# Patient Record
Sex: MALE | Race: WHITE | NOT HISPANIC OR LATINO | URBAN - METROPOLITAN AREA
[De-identification: names, ages, dates, MRNs, and addresses within clinical notes are randomized per-mention and may not be internally consistent; named-entity substitution may affect disease eponyms.]

---

## 2017-08-07 ENCOUNTER — OUTPATIENT (OUTPATIENT)
Dept: OUTPATIENT SERVICES | Facility: HOSPITAL | Age: 27
LOS: 1 days | Discharge: HOME | End: 2017-08-07

## 2017-08-07 DIAGNOSIS — F16.20 HALLUCINOGEN DEPENDENCE, UNCOMPLICATED: ICD-10-CM

## 2017-08-07 DIAGNOSIS — F33.1 MAJOR DEPRESSIVE DISORDER, RECURRENT, MODERATE: ICD-10-CM

## 2017-08-07 DIAGNOSIS — F10.20 ALCOHOL DEPENDENCE, UNCOMPLICATED: ICD-10-CM

## 2017-08-12 ENCOUNTER — EMERGENCY (EMERGENCY)
Facility: HOSPITAL | Age: 27
LOS: 0 days | Discharge: HOME | End: 2017-08-12
Admitting: INTERNAL MEDICINE

## 2017-08-12 DIAGNOSIS — K08.89 OTHER SPECIFIED DISORDERS OF TEETH AND SUPPORTING STRUCTURES: ICD-10-CM

## 2017-08-12 DIAGNOSIS — K05.219 AGGRESSIVE PERIODONTITIS, LOCALIZED, UNSPECIFIED SEVERITY: ICD-10-CM

## 2017-08-12 DIAGNOSIS — Z79.899 OTHER LONG TERM (CURRENT) DRUG THERAPY: ICD-10-CM

## 2017-08-12 DIAGNOSIS — Z87.891 PERSONAL HISTORY OF NICOTINE DEPENDENCE: ICD-10-CM

## 2018-05-08 ENCOUNTER — INPATIENT (INPATIENT)
Facility: HOSPITAL | Age: 28
LOS: 2 days | Discharge: HOME | End: 2018-05-11
Attending: INTERNAL MEDICINE | Admitting: INTERNAL MEDICINE

## 2018-05-08 VITALS
HEART RATE: 84 BPM | RESPIRATION RATE: 16 BRPM | WEIGHT: 154.98 LBS | DIASTOLIC BLOOD PRESSURE: 79 MMHG | HEIGHT: 64 IN | SYSTOLIC BLOOD PRESSURE: 131 MMHG | TEMPERATURE: 97 F

## 2018-05-08 DIAGNOSIS — F39 UNSPECIFIED MOOD [AFFECTIVE] DISORDER: ICD-10-CM

## 2018-05-08 DIAGNOSIS — F10.20 ALCOHOL DEPENDENCE, UNCOMPLICATED: ICD-10-CM

## 2018-05-08 DIAGNOSIS — F17.200 NICOTINE DEPENDENCE, UNSPECIFIED, UNCOMPLICATED: ICD-10-CM

## 2018-05-08 DIAGNOSIS — F41.9 ANXIETY DISORDER, UNSPECIFIED: ICD-10-CM

## 2018-05-08 DIAGNOSIS — F12.10 CANNABIS ABUSE, UNCOMPLICATED: ICD-10-CM

## 2018-05-08 DIAGNOSIS — Z98.890 OTHER SPECIFIED POSTPROCEDURAL STATES: Chronic | ICD-10-CM

## 2018-05-08 DIAGNOSIS — F16.10 HALLUCINOGEN ABUSE, UNCOMPLICATED: ICD-10-CM

## 2018-05-08 LAB
ALBUMIN SERPL ELPH-MCNC: 5.3 G/DL — HIGH (ref 3.5–5.2)
ALP SERPL-CCNC: 99 U/L — SIGNIFICANT CHANGE UP (ref 30–115)
ALT FLD-CCNC: 27 U/L — SIGNIFICANT CHANGE UP (ref 0–41)
AMMONIA BLD-MCNC: 53 UMOL/L — SIGNIFICANT CHANGE UP (ref 11–55)
AMYLASE P1 CFR SERPL: 38 U/L — SIGNIFICANT CHANGE UP (ref 25–115)
ANION GAP SERPL CALC-SCNC: 15 MMOL/L — HIGH (ref 7–14)
APTT BLD: 28.2 SEC — SIGNIFICANT CHANGE UP (ref 27–39.2)
AST SERPL-CCNC: 22 U/L — SIGNIFICANT CHANGE UP (ref 0–41)
BILIRUB SERPL-MCNC: 0.5 MG/DL — SIGNIFICANT CHANGE UP (ref 0.2–1.2)
BUN SERPL-MCNC: 15 MG/DL — SIGNIFICANT CHANGE UP (ref 10–20)
CALCIUM SERPL-MCNC: 10.5 MG/DL — HIGH (ref 8.5–10.1)
CHLORIDE SERPL-SCNC: 96 MMOL/L — LOW (ref 98–110)
CHOLEST SERPL-MCNC: 199 MG/DL — SIGNIFICANT CHANGE UP (ref 100–200)
CO2 SERPL-SCNC: 33 MMOL/L — HIGH (ref 17–32)
CREAT SERPL-MCNC: 1.1 MG/DL — SIGNIFICANT CHANGE UP (ref 0.7–1.5)
ETHANOL SERPL-MCNC: <10 MG/DL — HIGH
GGT SERPL-CCNC: 59 U/L — HIGH (ref 1–40)
GLUCOSE SERPL-MCNC: 86 MG/DL — SIGNIFICANT CHANGE UP (ref 70–99)
HCT VFR BLD CALC: 48 % — SIGNIFICANT CHANGE UP (ref 42–52)
HDLC SERPL-MCNC: 46 MG/DL — SIGNIFICANT CHANGE UP (ref 40–125)
HGB BLD-MCNC: 17 G/DL — SIGNIFICANT CHANGE UP (ref 14–18)
HIV 1 & 2 AB SERPL IA.RAPID: SIGNIFICANT CHANGE UP
INR BLD: 1.19 RATIO — SIGNIFICANT CHANGE UP (ref 0.65–1.3)
LIPID PNL WITH DIRECT LDL SERPL: 127 MG/DL — SIGNIFICANT CHANGE UP (ref 4–129)
MAGNESIUM SERPL-MCNC: 2.2 MG/DL — SIGNIFICANT CHANGE UP (ref 1.8–2.4)
MCHC RBC-ENTMCNC: 29.6 PG — SIGNIFICANT CHANGE UP (ref 27–31)
MCHC RBC-ENTMCNC: 35.4 G/DL — SIGNIFICANT CHANGE UP (ref 32–37)
MCV RBC AUTO: 83.6 FL — SIGNIFICANT CHANGE UP (ref 80–94)
NRBC # BLD: 0 /100 WBCS — SIGNIFICANT CHANGE UP (ref 0–0)
PLATELET # BLD AUTO: 281 K/UL — SIGNIFICANT CHANGE UP (ref 130–400)
POTASSIUM SERPL-MCNC: 4.2 MMOL/L — SIGNIFICANT CHANGE UP (ref 3.5–5)
POTASSIUM SERPL-SCNC: 4.2 MMOL/L — SIGNIFICANT CHANGE UP (ref 3.5–5)
PROT SERPL-MCNC: 7.2 G/DL — SIGNIFICANT CHANGE UP (ref 6–8)
PROTHROM AB SERPL-ACNC: 12.9 SEC — HIGH (ref 9.95–12.87)
RBC # BLD: 5.74 M/UL — SIGNIFICANT CHANGE UP (ref 4.7–6.1)
RBC # FLD: 12 % — SIGNIFICANT CHANGE UP (ref 11.5–14.5)
SODIUM SERPL-SCNC: 144 MMOL/L — SIGNIFICANT CHANGE UP (ref 135–146)
TOTAL CHOLESTEROL/HDL RATIO MEASUREMENT: 4.3 RATIO — SIGNIFICANT CHANGE UP (ref 4–5.5)
TRIGL SERPL-MCNC: 220 MG/DL — HIGH (ref 10–149)
VALPROATE SERPL-MCNC: 11 UG/ML — LOW (ref 50–100)
WBC # BLD: 11.61 K/UL — HIGH (ref 4.8–10.8)
WBC # FLD AUTO: 11.61 K/UL — HIGH (ref 4.8–10.8)

## 2018-05-08 RX ORDER — BUPROPION HYDROCHLORIDE 150 MG/1
300 TABLET, EXTENDED RELEASE ORAL DAILY
Qty: 0 | Refills: 0 | Status: DISCONTINUED | OUTPATIENT
Start: 2018-05-08 | End: 2018-05-11

## 2018-05-08 RX ORDER — DIVALPROEX SODIUM 500 MG/1
500 TABLET, DELAYED RELEASE ORAL DAILY
Qty: 0 | Refills: 0 | Status: DISCONTINUED | OUTPATIENT
Start: 2018-05-08 | End: 2018-05-11

## 2018-05-08 RX ORDER — TRAZODONE HCL 50 MG
100 TABLET ORAL AT BEDTIME
Qty: 0 | Refills: 0 | Status: DISCONTINUED | OUTPATIENT
Start: 2018-05-08 | End: 2018-05-11

## 2018-05-08 RX ORDER — IBUPROFEN 200 MG
400 TABLET ORAL EVERY 6 HOURS
Qty: 0 | Refills: 0 | Status: DISCONTINUED | OUTPATIENT
Start: 2018-05-08 | End: 2018-05-11

## 2018-05-08 RX ORDER — HYDROXYZINE HCL 10 MG
50 TABLET ORAL EVERY 6 HOURS
Qty: 0 | Refills: 0 | Status: DISCONTINUED | OUTPATIENT
Start: 2018-05-08 | End: 2018-05-11

## 2018-05-08 RX ORDER — FOLIC ACID 0.8 MG
1 TABLET ORAL DAILY
Qty: 0 | Refills: 0 | Status: DISCONTINUED | OUTPATIENT
Start: 2018-05-08 | End: 2018-05-11

## 2018-05-08 RX ORDER — THIAMINE MONONITRATE (VIT B1) 100 MG
100 TABLET ORAL DAILY
Qty: 0 | Refills: 0 | Status: COMPLETED | OUTPATIENT
Start: 2018-05-08 | End: 2018-05-11

## 2018-05-08 RX ORDER — NICOTINE POLACRILEX 2 MG
1 GUM BUCCAL DAILY
Qty: 0 | Refills: 0 | Status: DISCONTINUED | OUTPATIENT
Start: 2018-05-08 | End: 2018-05-11

## 2018-05-08 RX ADMIN — Medication 50 MILLIGRAM(S): at 19:07

## 2018-05-08 RX ADMIN — Medication 100 MILLIGRAM(S): at 20:43

## 2018-05-08 RX ADMIN — Medication 1 PATCH: at 20:53

## 2018-05-08 NOTE — H&P ADULT - PROBLEM SELECTOR PLAN 1
Check Urine Toxicology  [facility] librium Protocol  Thiamine 100mg PO daily  Folic Acid 1mg PO daily  MVI 1 tablet PO daily  Monitor VS and withdrawal symptoms  PRN Medications

## 2018-05-08 NOTE — H&P ADULT - NSHPPHYSICALEXAM_GEN_ALL_CORE
-  Vital Signs:      Temp:98.1    Pulse:88       RR: 14       BP: 118/96        BTZ   level: 0.018       Physical Exam:              Constitutional: +Anxious, hyperactive behavior, A&Ox3, W/N and W/D.  HEENT: NC/AT, PERRLA, EOM Intact, Nares normal, No Sinus tenderness.  Lips, mucosa and tongue normal; Neck supple, No adenopathy  Respiratory: CTAB, no rales, no rhonchi, no wheezes  Cardiovascular: +S1S2, No M/R/G  Gastrointestinal: +BS, soft, non-tender, not distended, No CVAT  Extremities: Atraumatic, no cyanosis, no edema, no calf tenderness,  Vascular: +dorsal pedis and radial pulses, no extremity cyanosis  Neurological: sensation intact, ROM equal B/L, CN II-XII intact, Gait: steady  Skin: no rashes, no lesions, normal turgor, No track marks  No Decubiti present  No IV lines present  Rectal/Breasts Exam: Deferred

## 2018-05-08 NOTE — H&P ADULT - NSHPREVIEWOFSYSTEMS_GEN_ALL_CORE
:-  CONSTITUTIONAL: +hot and chill intermittently, No weakness or fevers, No weight loss  PAIN (1 to 10 scales):0   SLEEPING HABITS: +Insomnia, No TEJ, Narcolepsy, or Somnambulism, Resltess leg  SKIN: No itching, rashes. pruritus, dryness, hair or nail changes.  EYES/ENT: No visual changes;  No vertigo or throat pain   NECK: No pain or stiffness  LYMPH NODES: No enlarged glands  RESPIRATORY: No cough, wheezing, hemoptysis; No shortness of breath  CARDIOVASCULAR: No chest pain or palpitations, No Dyspnea on exertion, PND, othopnea. or claudication.  BREASTS: No pain, masses, or nipple discharge     ENDOCRINE: No heat or cold intolerance; No hair loss  GASTROINTESTINAL: No Nausea or diarrhea in earlier, No change in bowel habits, appetite, No abdominal pain. No vomiting, or hematemesis;  No melena or hematochezia.   GENITOURINARY: No dysuria, frequency or hematuria, No penile discharge or testicular pain  NEUROLOGICAL: No numbness or weakness, headache, seizure, gait disturbances, dizziness, numbness or weakness.  MUSCULOSKELETAL: No arthritis, +myalgias, No joint and muscle stiffness  PSYCHIATRIC: + Anxiety, +Depression,  No mood swings, Denies S/H ideation, Denies AVH  Others: Denies

## 2018-05-08 NOTE — H&P ADULT - PROBLEM SELECTOR PLAN 2
supportive care with prn use  Check Urine Toxicology  Learning coping skill and encouraging long term sobriety  MAT and Rehab were also encouraged

## 2018-05-08 NOTE — H&P ADULT - PROBLEM SELECTOR PLAN 6
c/w:  depakote Er 500 mg po daily  wellbutrin  mg po qhs  observation  trazodone 100 mg po qhs PRN for insomnia  psych consult PRN

## 2018-05-08 NOTE — H&P ADULT - HISTORY OF PRESENT ILLNESS
28y Male presents for detox with continuous Alcohol use disorder. Patient c/o feeling anxiety, insomnia, poor appetite, hot and chills intermittently and tremors. Denies H/O detox or rehab. Patient reports he is court mandated for detox and currently attends outpatient program from [place].  Withdrawal history:  + MELANIE  + Tremors  - w/d seizure  - DTs  - a/v Hallucination  Patient admits to abusing current substances as follows:  DRUG	AGE OF ONSET	ROUTE	FREQ	AMOUNT	LAST USE	LENGTH OF CURRENT USE	  ETOH	26 YO	PO	Daily	6-8 cans of 40 oz beer	5/8/18 40oz	4 weeks	  Cannabis	15 YO	Smoking	Occasional	$30			  PCP	26 YO	  //	//	$20			  Denies other drugs abuse							  							  Last Detox: Never  Hx of Withdrawal Seizures: No        Psyhx: Anxiety and Mood disorder, "I was labeled with bipolar, not by diagnosed"   Denies any S/H Ideation or A/V Hallucination  Is patient currently receiving methadone from an [facility]:No  Screening history	Last tested	Result	History of treatment	  HIV	2018	NEG	N/A	  Hepatitis C	2018	NEG	N/A	  Quantiferon GOLD TB test	2018	NEG	N/A	    Immunization	Not Received	Unknown	Received	Date Received 	  Influenza		v			  Pneumococcus		v			  Tetanus			v	<10 years	  Others					  					  I-Stop:     his report was requested by: Jorge A Rae | Reference #: [id number]  There are no results for the search terms that you entered.

## 2018-05-08 NOTE — H&P ADULT - ASSESSMENT
28y Male presents for detox with continuous Alcohol use disorder. Patient c/o feeling anxiety, insomnia, poor appetite, hot and chills intermittently and tremors.

## 2018-05-08 NOTE — H&P ADULT - PROBLEM SELECTOR PLAN 4
Nicotine patch 14 mg daily, patient insists on getting nicotine patch  with this amount of dose, side effect of nicotine patch d/w patient.   SE and Removal of the patch was educated   Smoking Cessation advised  Smoking Education provided

## 2018-05-09 LAB
APPEARANCE UR: CLEAR — SIGNIFICANT CHANGE UP
BILIRUB UR-MCNC: NEGATIVE — SIGNIFICANT CHANGE UP
COLOR SPEC: YELLOW — SIGNIFICANT CHANGE UP
DIFF PNL FLD: NEGATIVE — SIGNIFICANT CHANGE UP
ESTIMATED AVERAGE GLUCOSE: 117 MG/DL — HIGH (ref 68–114)
GLUCOSE UR QL: NEGATIVE MG/DL — SIGNIFICANT CHANGE UP
HAV IGM SER-ACNC: SIGNIFICANT CHANGE UP
HBA1C BLD-MCNC: 5.7 % — HIGH (ref 4–5.6)
HBV CORE IGM SER-ACNC: SIGNIFICANT CHANGE UP
HBV SURFACE AG SER-ACNC: SIGNIFICANT CHANGE UP
HCV AB S/CO SERPL IA: 0.06 S/CO — SIGNIFICANT CHANGE UP
HCV AB SERPL-IMP: SIGNIFICANT CHANGE UP
KETONES UR-MCNC: NEGATIVE — SIGNIFICANT CHANGE UP
LEUKOCYTE ESTERASE UR-ACNC: NEGATIVE — SIGNIFICANT CHANGE UP
NITRITE UR-MCNC: NEGATIVE — SIGNIFICANT CHANGE UP
PH UR: 6.5 — SIGNIFICANT CHANGE UP (ref 5–8)
PROT UR-MCNC: NEGATIVE MG/DL — SIGNIFICANT CHANGE UP
SP GR SPEC: 1.02 — SIGNIFICANT CHANGE UP (ref 1.01–1.03)
UROBILINOGEN FLD QL: 0.2 MG/DL — SIGNIFICANT CHANGE UP (ref 0.2–0.2)

## 2018-05-09 RX ORDER — SALICYLIC ACID 0.5 %
1 CLEANSER (GRAM) TOPICAL
Qty: 0 | Refills: 0 | Status: DISCONTINUED | OUTPATIENT
Start: 2018-05-09 | End: 2018-05-11

## 2018-05-09 RX ADMIN — Medication 1 TABLET(S): at 08:37

## 2018-05-09 RX ADMIN — Medication 1 MILLIGRAM(S): at 08:37

## 2018-05-09 RX ADMIN — Medication 50 MILLIGRAM(S): at 20:13

## 2018-05-09 RX ADMIN — Medication 50 MILLIGRAM(S): at 12:52

## 2018-05-09 RX ADMIN — Medication 1 PATCH: at 09:11

## 2018-05-09 RX ADMIN — Medication 100 MILLIGRAM(S): at 08:37

## 2018-05-09 RX ADMIN — Medication 1 APPLICATION(S): at 20:14

## 2018-05-09 RX ADMIN — BUPROPION HYDROCHLORIDE 300 MILLIGRAM(S): 150 TABLET, EXTENDED RELEASE ORAL at 08:37

## 2018-05-09 RX ADMIN — DIVALPROEX SODIUM 500 MILLIGRAM(S): 500 TABLET, DELAYED RELEASE ORAL at 08:38

## 2018-05-09 RX ADMIN — Medication 1 APPLICATION(S): at 11:25

## 2018-05-09 RX ADMIN — Medication 100 MILLIGRAM(S): at 20:41

## 2018-05-10 LAB
AMPHET UR-MCNC: NEGATIVE — SIGNIFICANT CHANGE UP
BARBITURATES UR SCN-MCNC: NEGATIVE — SIGNIFICANT CHANGE UP
BENZODIAZ UR-MCNC: NEGATIVE — SIGNIFICANT CHANGE UP
COCAINE METAB.OTHER UR-MCNC: NEGATIVE — SIGNIFICANT CHANGE UP
M TB TUBERC IFN-G BLD QL: 0 IU/ML — SIGNIFICANT CHANGE UP
M TB TUBERC IFN-G BLD QL: 0.01 IU/ML — SIGNIFICANT CHANGE UP
M TB TUBERC IFN-G BLD QL: NEGATIVE — SIGNIFICANT CHANGE UP
METHADONE UR-MCNC: NEGATIVE — SIGNIFICANT CHANGE UP
MITOGEN IGNF BCKGRD COR BLD-ACNC: >10 IU/ML — SIGNIFICANT CHANGE UP
OPIATES UR-MCNC: NEGATIVE — SIGNIFICANT CHANGE UP
PCP SPEC-MCNC: SIGNIFICANT CHANGE UP
PROPOXYPHENE QUALITATIVE URINE RESULT: NEGATIVE — SIGNIFICANT CHANGE UP

## 2018-05-10 RX ORDER — INFLUENZA VIRUS VACCINE 15; 15; 15; 15 UG/.5ML; UG/.5ML; UG/.5ML; UG/.5ML
0.5 SUSPENSION INTRAMUSCULAR ONCE
Qty: 0 | Refills: 0 | Status: COMPLETED | OUTPATIENT
Start: 2018-05-10 | End: 2018-05-10

## 2018-05-10 RX ADMIN — Medication 1 APPLICATION(S): at 09:08

## 2018-05-10 RX ADMIN — DIVALPROEX SODIUM 500 MILLIGRAM(S): 500 TABLET, DELAYED RELEASE ORAL at 09:09

## 2018-05-10 RX ADMIN — BUPROPION HYDROCHLORIDE 300 MILLIGRAM(S): 150 TABLET, EXTENDED RELEASE ORAL at 09:07

## 2018-05-10 RX ADMIN — Medication 1 PATCH: at 09:08

## 2018-05-10 RX ADMIN — Medication 100 MILLIGRAM(S): at 21:03

## 2018-05-10 RX ADMIN — Medication 50 MILLIGRAM(S): at 09:10

## 2018-05-10 RX ADMIN — Medication 50 MILLIGRAM(S): at 15:09

## 2018-05-10 RX ADMIN — Medication 1 TABLET(S): at 09:07

## 2018-05-10 RX ADMIN — Medication 1 APPLICATION(S): at 09:07

## 2018-05-10 RX ADMIN — Medication 1 PATCH: at 09:09

## 2018-05-10 RX ADMIN — Medication 1 MILLIGRAM(S): at 09:07

## 2018-05-10 RX ADMIN — Medication 100 MILLIGRAM(S): at 09:07

## 2018-05-10 RX ADMIN — Medication 50 MILLIGRAM(S): at 21:04

## 2018-05-10 NOTE — CHART NOTE - NSCHARTNOTEFT_GEN_A_CORE
Subsequent Inpatient Encounter                                       Detox Unit    JAUN GALVAN   28y   Male      Chief Complaint:    Follow up for Alcohol  Dependency    HPI:     I reviewed previous notes. No Change, except if noted below.             Detail:_    ROS:   I reviewed with patient.  No changes from previous notes except if noted below.             Detail: _    PFSH I reviewed with patient. No changes from previous notes except if noted below.             Detail_    Medication reconciliation performed.    MEDICATIONS  (STANDING):  buPROPion XL . 300 milliGRAM(s) Oral daily  clotrimazole 1% Cream 1 Application(s) Topical two times a day  diVALproex  milliGRAM(s) Oral daily  folic acid 1 milliGRAM(s) Oral daily  multivitamin 1 Tablet(s) Oral daily  nicotine -  14 mG/24Hr(s) Patch 1 Patch Transdermal daily  thiamine 100 milliGRAM(s) Oral daily      MEDICATIONS  (PRN):  aluminum hydroxide/magnesium hydroxide/simethicone Suspension 30 milliLiter(s) Oral every 4 hours PRN Dyspepsia  chlordiazePOXIDE 25 milliGRAM(s) Oral every 2 hours PRN Withdrawal  chlordiazePOXIDE 50 milliGRAM(s) Oral every 1 hour PRN Withdrawal  cloNIDine 0.1 milliGRAM(s) Oral every 6 hours PRN bp >140/90, hold bp <90/60  hydrOXYzine hydrochloride 50 milliGRAM(s) Oral every 6 hours PRN Anxiety  ibuprofen  Tablet 400 milliGRAM(s) Oral every 6 hours PRN Pain  traZODone 100 milliGRAM(s) Oral at bedtime PRN Insomnia  vitamin A &amp; D Ointment 1 Application(s) Topical two times a day PRN dermatitis      T(C): 35.7 (05-10-18 @ 06:33), Max: 36.7 (18 @ 16:00)  HR: 90 (05-10-18 @ 06:33) (61 - 90)  BP: 101/50 (05-10-18 @ 06:33) (100/61 - 119/77)  RR: 16 (05-10-18 @ 06:33) (14 - 16)  SpO2: --    PHYSICAL EXAM:      Constitutional: NAD, A&O x3    Eyes: PERRLA, no conjuctivitis    Neck: no lymphadenopathy    Respiratory: +air entry, no rales, no rhonchi, no wheezes    Cardiovascular: +S1 and S2, regular rate and rhythm    Gastrointestinal: +BS, soft, non-tender, not distended    Extremities:  no edema, no calf tenderness    Skin: no rashes, normal turgor                            17.0   11.61 )-----------( 281      ( 08 May 2018 20:06 )             48.0       144  |  96<L>  |  15  ----------------------------<  86  4.2   |  33<H>  |  1.1    Ca    10.5<H>      08 May 2018 20:06  Mg     2.2         TPro  7.2  /  Alb  5.3<H>  /  TBili  0.5  /  DBili  x   /  AST  22  /  ALT  27  /  AlkPhos  99    PT/INR - ( 08 May 2018 20:06 )   PT: 12.90 sec;   INR: 1.19 ratio         PTT - ( 08 May 2018 20:06 )  PTT:28.2 sec  Magnesium, Serum: 2.2 mg/dL (18 @ 20:06)  Ammonia, Serum: 53 umol/L (18 @ 20:06)  Amylase, Serum Total: 38 U/L (18 @ 20:06)  Hemoglobin A1C, Whole Blood: 5.7 % (18 @ 20:06)  Hepatitis B Surface Antigen: Nonreact (18 @ 20:06)  Hepatitis C Virus S/CO Ratio: 0.06 S/CO (18 @ 20:06)    Hepatitis C Virus Interpretation: Nonreact (18 @ 20:06)      Urinalysis Basic - ( 09 May 2018 08:22 )    Color: Yellow / Appearance: Clear / S.025 / pH: x  Gluc: x / Ketone: Negative  / Bili: Negative / Urobili: 0.2 mg/dL   Blood: x / Protein: Negative mg/dL / Nitrite: Negative   Leuk Esterase: Negative / RBC: x / WBC x   Sq Epi: x / Non Sq Epi: x / Bacteria: x          Impression and Plan:    Primary Diagnosis:  Alcohol Dependency                                Medication: Librium Protocol/ CIWA Protocol    Secondary Diagnosis:      Depression                                                            Medication:  wellbutrin    Tertiary Diagnosis:                                                                       Medication      Continue Detox Protocols. Use of PRNS as needed for withdrawal and comfort.    Adjustments to protocols:    Labs/ Tests reviewed.    Tests ordered:     Likely Disposition: _X__Home       ___Rehab       ___Outpatient Program    ___Self Help     _____Other    Estimated Length of stay:__3__

## 2018-05-11 VITALS
TEMPERATURE: 96 F | SYSTOLIC BLOOD PRESSURE: 136 MMHG | RESPIRATION RATE: 16 BRPM | DIASTOLIC BLOOD PRESSURE: 73 MMHG | HEART RATE: 77 BPM

## 2018-05-11 RX ORDER — DIVALPROEX SODIUM 500 MG/1
1 TABLET, DELAYED RELEASE ORAL
Qty: 0 | Refills: 0 | COMMUNITY
Start: 2018-05-11

## 2018-05-11 RX ORDER — DIVALPROEX SODIUM 500 MG/1
0 TABLET, DELAYED RELEASE ORAL
Qty: 30 | Refills: 0 | COMMUNITY

## 2018-05-11 RX ORDER — BUPROPION HYDROCHLORIDE 150 MG/1
0 TABLET, EXTENDED RELEASE ORAL
Qty: 60 | Refills: 0 | COMMUNITY

## 2018-05-11 RX ORDER — BUPROPION HYDROCHLORIDE 150 MG/1
1 TABLET, EXTENDED RELEASE ORAL
Qty: 0 | Refills: 0 | DISCHARGE
Start: 2018-05-11

## 2018-05-11 RX ORDER — BUPROPION HYDROCHLORIDE 150 MG/1
1 TABLET, EXTENDED RELEASE ORAL
Qty: 0 | Refills: 0 | COMMUNITY
Start: 2018-05-11

## 2018-05-11 RX ADMIN — Medication 1 PATCH: at 08:45

## 2018-05-11 RX ADMIN — Medication 100 MILLIGRAM(S): at 08:42

## 2018-05-11 RX ADMIN — BUPROPION HYDROCHLORIDE 300 MILLIGRAM(S): 150 TABLET, EXTENDED RELEASE ORAL at 08:42

## 2018-05-11 RX ADMIN — Medication 1 TABLET(S): at 08:42

## 2018-05-11 RX ADMIN — Medication 1 APPLICATION(S): at 08:43

## 2018-05-11 RX ADMIN — DIVALPROEX SODIUM 500 MILLIGRAM(S): 500 TABLET, DELAYED RELEASE ORAL at 08:44

## 2018-05-11 RX ADMIN — Medication 1 MILLIGRAM(S): at 08:42

## 2018-05-11 NOTE — CHART NOTE - NSCHARTNOTEFT_GEN_A_CORE
The patient was admitted to the inpt detox unit CDU, for   ETOH_   are present in the preceding History & Physical section and follow up chart notes.  patient was evaluated on daily detox team  rounds.  Withdrawal symptoms and signs were reviewed on a daily basis, and the protocols were adjusted accordingly.    Labs and imaging results were reviewed and discussed with the patient.    All questions from the patient were addressed.  The patient was seen by the Chemical dependency counselors, and different options for after care were discussed.  The patient attended groups, meetings and 1:1 sessions with the counselors.  Narcane Kit was offered and instructions given prior to discharge.    Psychiatry consultation reviewed______, N/A__x____    Physical therapy evaluation reviewed_____, N/A_x___    Pt was given copies of labs and imaging reports, if applicable.    Prescriptions if needed, were sent through ERx system to the pharmacy amnd are noted in the discharge instruction sheet.    After care was arranged by counselors and pt was discharged to:    Home_x__, Outpt. Program_x__, Rehab ___, Long term____ [facility] ____ I[facility]____ SNF____, AMA___, Admin Discharge____    Principal Diagnosis: Alcohol Dependency_x___ Opioid Dependency___ Benzo Dependency____ Polysubstance Dependency____

## 2018-05-15 DIAGNOSIS — F12.90 CANNABIS USE, UNSPECIFIED, UNCOMPLICATED: ICD-10-CM

## 2018-05-15 DIAGNOSIS — G47.00 INSOMNIA, UNSPECIFIED: ICD-10-CM

## 2018-05-15 DIAGNOSIS — F17.210 NICOTINE DEPENDENCE, CIGARETTES, UNCOMPLICATED: ICD-10-CM

## 2018-05-15 DIAGNOSIS — F10.230 ALCOHOL DEPENDENCE WITH WITHDRAWAL, UNCOMPLICATED: ICD-10-CM

## 2018-05-15 DIAGNOSIS — F41.9 ANXIETY DISORDER, UNSPECIFIED: ICD-10-CM

## 2018-05-15 DIAGNOSIS — F39 UNSPECIFIED MOOD [AFFECTIVE] DISORDER: ICD-10-CM

## 2018-05-23 ENCOUNTER — INPATIENT (INPATIENT)
Facility: HOSPITAL | Age: 28
LOS: 5 days | Discharge: HOME | End: 2018-05-29
Attending: INTERNAL MEDICINE | Admitting: INTERNAL MEDICINE

## 2018-05-23 VITALS
SYSTOLIC BLOOD PRESSURE: 120 MMHG | RESPIRATION RATE: 16 BRPM | DIASTOLIC BLOOD PRESSURE: 75 MMHG | TEMPERATURE: 97 F | HEART RATE: 82 BPM

## 2018-05-23 DIAGNOSIS — F10.20 ALCOHOL DEPENDENCE, UNCOMPLICATED: ICD-10-CM

## 2018-05-23 DIAGNOSIS — F12.20 CANNABIS DEPENDENCE, UNCOMPLICATED: ICD-10-CM

## 2018-05-23 DIAGNOSIS — B35.3 TINEA PEDIS: ICD-10-CM

## 2018-05-23 DIAGNOSIS — F39 UNSPECIFIED MOOD [AFFECTIVE] DISORDER: ICD-10-CM

## 2018-05-23 DIAGNOSIS — Z98.890 OTHER SPECIFIED POSTPROCEDURAL STATES: Chronic | ICD-10-CM

## 2018-05-23 DIAGNOSIS — F90.9 ATTENTION-DEFICIT HYPERACTIVITY DISORDER, UNSPECIFIED TYPE: ICD-10-CM

## 2018-05-23 DIAGNOSIS — F41.9 ANXIETY DISORDER, UNSPECIFIED: ICD-10-CM

## 2018-05-23 DIAGNOSIS — I10 ESSENTIAL (PRIMARY) HYPERTENSION: ICD-10-CM

## 2018-05-23 DIAGNOSIS — E78.00 PURE HYPERCHOLESTEROLEMIA, UNSPECIFIED: ICD-10-CM

## 2018-05-23 DIAGNOSIS — F17.200 NICOTINE DEPENDENCE, UNSPECIFIED, UNCOMPLICATED: ICD-10-CM

## 2018-05-23 LAB
ALBUMIN SERPL ELPH-MCNC: 5.6 G/DL — HIGH (ref 3.5–5.2)
ALP SERPL-CCNC: 107 U/L — SIGNIFICANT CHANGE UP (ref 30–115)
ALT FLD-CCNC: 29 U/L — SIGNIFICANT CHANGE UP (ref 0–41)
ANION GAP SERPL CALC-SCNC: 12 MMOL/L — SIGNIFICANT CHANGE UP (ref 7–14)
APPEARANCE UR: CLEAR — SIGNIFICANT CHANGE UP
APTT BLD: 28.4 SEC — SIGNIFICANT CHANGE UP (ref 27–39.2)
AST SERPL-CCNC: 25 U/L — SIGNIFICANT CHANGE UP (ref 0–41)
BASOPHILS # BLD AUTO: 0.07 K/UL — SIGNIFICANT CHANGE UP (ref 0–0.2)
BASOPHILS NFR BLD AUTO: 0.8 % — SIGNIFICANT CHANGE UP (ref 0–1)
BILIRUB SERPL-MCNC: 0.2 MG/DL — SIGNIFICANT CHANGE UP (ref 0.2–1.2)
BILIRUB UR-MCNC: NEGATIVE — SIGNIFICANT CHANGE UP
BUN SERPL-MCNC: 19 MG/DL — SIGNIFICANT CHANGE UP (ref 10–20)
CALCIUM SERPL-MCNC: 10.7 MG/DL — HIGH (ref 8.5–10.1)
CHLORIDE SERPL-SCNC: 99 MMOL/L — SIGNIFICANT CHANGE UP (ref 98–110)
CHOLEST SERPL-MCNC: 232 MG/DL — HIGH (ref 100–200)
CO2 SERPL-SCNC: 32 MMOL/L — SIGNIFICANT CHANGE UP (ref 17–32)
COLOR SPEC: YELLOW — SIGNIFICANT CHANGE UP
CREAT SERPL-MCNC: 0.9 MG/DL — SIGNIFICANT CHANGE UP (ref 0.7–1.5)
DIFF PNL FLD: NEGATIVE — SIGNIFICANT CHANGE UP
EOSINOPHIL # BLD AUTO: 0.14 K/UL — SIGNIFICANT CHANGE UP (ref 0–0.7)
EOSINOPHIL NFR BLD AUTO: 1.5 % — SIGNIFICANT CHANGE UP (ref 0–8)
ETHANOL SERPL-MCNC: <10 MG/DL — HIGH
GGT SERPL-CCNC: 52 U/L — HIGH (ref 1–40)
GLUCOSE SERPL-MCNC: 80 MG/DL — SIGNIFICANT CHANGE UP (ref 70–99)
GLUCOSE UR QL: NEGATIVE MG/DL — SIGNIFICANT CHANGE UP
HCT VFR BLD CALC: 49.8 % — SIGNIFICANT CHANGE UP (ref 42–52)
HDLC SERPL-MCNC: 57 MG/DL — SIGNIFICANT CHANGE UP (ref 40–125)
HGB BLD-MCNC: 17.3 G/DL — SIGNIFICANT CHANGE UP (ref 14–18)
IMM GRANULOCYTES NFR BLD AUTO: 0.2 % — SIGNIFICANT CHANGE UP (ref 0.1–0.3)
INR BLD: 1.04 RATIO — SIGNIFICANT CHANGE UP (ref 0.65–1.3)
KETONES UR-MCNC: NEGATIVE — SIGNIFICANT CHANGE UP
LEUKOCYTE ESTERASE UR-ACNC: NEGATIVE — SIGNIFICANT CHANGE UP
LIPID PNL WITH DIRECT LDL SERPL: 154 MG/DL — HIGH (ref 4–129)
LYMPHOCYTES # BLD AUTO: 3.83 K/UL — HIGH (ref 1.2–3.4)
LYMPHOCYTES # BLD AUTO: 41.5 % — SIGNIFICANT CHANGE UP (ref 20.5–51.1)
MAGNESIUM SERPL-MCNC: 2.1 MG/DL — SIGNIFICANT CHANGE UP (ref 1.8–2.4)
MCHC RBC-ENTMCNC: 29.3 PG — SIGNIFICANT CHANGE UP (ref 27–31)
MCHC RBC-ENTMCNC: 34.7 G/DL — SIGNIFICANT CHANGE UP (ref 32–37)
MCV RBC AUTO: 84.3 FL — SIGNIFICANT CHANGE UP (ref 80–94)
MONOCYTES # BLD AUTO: 0.65 K/UL — HIGH (ref 0.1–0.6)
MONOCYTES NFR BLD AUTO: 7 % — SIGNIFICANT CHANGE UP (ref 1.7–9.3)
NEUTROPHILS # BLD AUTO: 4.53 K/UL — SIGNIFICANT CHANGE UP (ref 1.4–6.5)
NEUTROPHILS NFR BLD AUTO: 49 % — SIGNIFICANT CHANGE UP (ref 42.2–75.2)
NITRITE UR-MCNC: NEGATIVE — SIGNIFICANT CHANGE UP
NRBC # BLD: 0 /100 WBCS — SIGNIFICANT CHANGE UP (ref 0–0)
PH UR: 7 — SIGNIFICANT CHANGE UP (ref 5–8)
PLATELET # BLD AUTO: 276 K/UL — SIGNIFICANT CHANGE UP (ref 130–400)
POTASSIUM SERPL-MCNC: 4.4 MMOL/L — SIGNIFICANT CHANGE UP (ref 3.5–5)
POTASSIUM SERPL-SCNC: 4.4 MMOL/L — SIGNIFICANT CHANGE UP (ref 3.5–5)
PROT SERPL-MCNC: 7.9 G/DL — SIGNIFICANT CHANGE UP (ref 6–8)
PROT UR-MCNC: NEGATIVE MG/DL — SIGNIFICANT CHANGE UP
PROTHROM AB SERPL-ACNC: 11.2 SEC — SIGNIFICANT CHANGE UP (ref 9.95–12.87)
RBC # BLD: 5.91 M/UL — SIGNIFICANT CHANGE UP (ref 4.7–6.1)
RBC # FLD: 12.1 % — SIGNIFICANT CHANGE UP (ref 11.5–14.5)
SODIUM SERPL-SCNC: 143 MMOL/L — SIGNIFICANT CHANGE UP (ref 135–146)
SP GR SPEC: 1.02 — SIGNIFICANT CHANGE UP (ref 1.01–1.03)
TOTAL CHOLESTEROL/HDL RATIO MEASUREMENT: 4.1 RATIO — SIGNIFICANT CHANGE UP (ref 4–5.5)
TRIGL SERPL-MCNC: 145 MG/DL — SIGNIFICANT CHANGE UP (ref 10–149)
UROBILINOGEN FLD QL: 0.2 MG/DL — SIGNIFICANT CHANGE UP (ref 0.2–0.2)
VALPROATE SERPL-MCNC: <3 UG/ML — LOW (ref 50–100)
WBC # BLD: 9.24 K/UL — SIGNIFICANT CHANGE UP (ref 4.8–10.8)
WBC # FLD AUTO: 9.24 K/UL — SIGNIFICANT CHANGE UP (ref 4.8–10.8)

## 2018-05-23 RX ORDER — KETOCONAZOLE 20 MG/G
1 AEROSOL, FOAM TOPICAL EVERY 12 HOURS
Qty: 0 | Refills: 0 | Status: DISCONTINUED | OUTPATIENT
Start: 2018-05-23 | End: 2018-05-23

## 2018-05-23 RX ORDER — FOLIC ACID 0.8 MG
1 TABLET ORAL DAILY
Qty: 0 | Refills: 0 | Status: DISCONTINUED | OUTPATIENT
Start: 2018-05-23 | End: 2018-05-29

## 2018-05-23 RX ORDER — IBUPROFEN 200 MG
400 TABLET ORAL EVERY 6 HOURS
Qty: 0 | Refills: 0 | Status: DISCONTINUED | OUTPATIENT
Start: 2018-05-23 | End: 2018-05-29

## 2018-05-23 RX ORDER — DIVALPROEX SODIUM 500 MG/1
500 TABLET, DELAYED RELEASE ORAL DAILY
Qty: 0 | Refills: 0 | Status: DISCONTINUED | OUTPATIENT
Start: 2018-05-23 | End: 2018-05-29

## 2018-05-23 RX ORDER — ACETAMINOPHEN 500 MG
650 TABLET ORAL EVERY 6 HOURS
Qty: 0 | Refills: 0 | Status: DISCONTINUED | OUTPATIENT
Start: 2018-05-23 | End: 2018-05-29

## 2018-05-23 RX ORDER — THIAMINE MONONITRATE (VIT B1) 100 MG
100 TABLET ORAL DAILY
Qty: 0 | Refills: 0 | Status: DISCONTINUED | OUTPATIENT
Start: 2018-05-23 | End: 2018-05-29

## 2018-05-23 RX ORDER — HYDROXYZINE HCL 10 MG
50 TABLET ORAL EVERY 6 HOURS
Qty: 0 | Refills: 0 | Status: DISCONTINUED | OUTPATIENT
Start: 2018-05-23 | End: 2018-05-29

## 2018-05-23 RX ORDER — HYDROXYZINE HCL 10 MG
100 TABLET ORAL AT BEDTIME
Qty: 0 | Refills: 0 | Status: DISCONTINUED | OUTPATIENT
Start: 2018-05-23 | End: 2018-05-29

## 2018-05-23 RX ORDER — BUPROPION HYDROCHLORIDE 150 MG/1
300 TABLET, EXTENDED RELEASE ORAL DAILY
Qty: 0 | Refills: 0 | Status: DISCONTINUED | OUTPATIENT
Start: 2018-05-23 | End: 2018-05-29

## 2018-05-23 RX ORDER — HYDROXYZINE HCL 10 MG
50 TABLET ORAL AT BEDTIME
Qty: 0 | Refills: 0 | Status: DISCONTINUED | OUTPATIENT
Start: 2018-05-23 | End: 2018-05-23

## 2018-05-23 RX ORDER — NICOTINE POLACRILEX 2 MG
1 GUM BUCCAL DAILY
Qty: 0 | Refills: 0 | Status: DISCONTINUED | OUTPATIENT
Start: 2018-05-23 | End: 2018-05-29

## 2018-05-23 RX ADMIN — Medication 50 MILLIGRAM(S): at 17:02

## 2018-05-23 RX ADMIN — Medication 1 PATCH: at 11:41

## 2018-05-23 RX ADMIN — Medication 100 MILLIGRAM(S): at 21:01

## 2018-05-23 NOTE — H&P ADULT - PMH
ADHD    Anxiety    Cannabis dependence    HTN (hypertension)    Hypercholesterolemia    Mood disorder    Nicotine dependence    PCP abuse    Tinea pedis

## 2018-05-23 NOTE — H&P ADULT - NSHPREVIEWOFSYSTEMS_GEN_ALL_CORE
REVIEW OF SYSTEMS:    CONSTITUTIONAL: +loss appetitie, No weakness or fevers, No weight loss  PAIN (1 to 10 scles): 0  SLEEPING HABITS: +Insomnia, No TEJ, Narcolepsy, or Somnambulism, Resltess leg  SKIN: No itching, rashes. pruritus, dryness, hair or nail changes.hx of Tinea Pedis B/L  EYES/ENT: No visual changes;  No vertigo or throat pain   NECK: No pain or stiffness    LYMPH NODES: No enlarged glands  RESPIRATORY: No cough, wheezing, hemoptysis; No shortness of breath  CARDIOVASCULAR: No chest pain or palpitations,hx of HTN & Hypercholesterolemia in no medication  BREASTS: No pain, masses, or nipple discharge   .  ENDOCRINE: No heat or cold intolerance; No hair loss  GASTROINTESTINAL: No Nausea or diarrhea in earlier, No abdominal pain. No vomiting, or hematemesis;  No melena or    hematochezia.  GENITOURINARY: No dysuria, frequency or hematuria, No penile discharge or testicular pain  NEUROLOGICAL: No numbness or weakness  MUSCULOSKELETAL: No arthritis, , No joint Pain and No  Muscles stiffness  PSYCHIATRIC: + Anxiety, +Depression, ADHD,and Mood D/O .Denies S/H ideation, Denies AVH ,Compliant with Meication  Others:

## 2018-05-23 NOTE — H&P ADULT - ASSESSMENT
This is a 29 Y/O white male with Hx of Continous Alcohol & cannabis Dependency,hx of PCP abuse   S/P Detox at [facility] 5/08/18-5/11/18.  Hx of 3 rehabs in the past,last Rehab at [place] x 28 days in 2017 (longest Sobriety)

## 2018-05-23 NOTE — H&P ADULT - PROBLEM SELECTOR PLAN 7
Observation  Ataraxl 50 mg po Q6H  PRN anxiety  Atarax 100 mg po QHS PRN Insomnia  Psych. Consult Prn  Continue:  Depakote 500 mg po daily  Wellbutrin  mg po daily

## 2018-05-23 NOTE — H&P ADULT - HISTORY OF PRESENT ILLNESS
DRUG	AGE OF ONSET	ROUTE	FREQ	AMOUNT	LAST USE	LENGTH OF CURRENT USE	  ALCOHOL	26 Y/O	PO	Twice Weekly since D/C 5/11/18	6-8 (40-OZ) beer	5/22/18 3 cans	2 Weeks	  CANNABIS	15 Y/O	Smoking	Daily	$35	5/21/18 $35	2 weeks	  PCP	26 Y/O	Smoking	Twice Monthly	$20	A Week PTA	2 weeks	  Pt denied any other Drugs or IVDA							  							      This is a 29 Y/O white male with Hx of Continous Alcohol & cannabis Dependency,hx of PCP abuse   S/P Detox at [facility] 5/08/18-5/11/18.  Hx of 3 rehabs in the past,last Rehab at [place] x 28 days in 2017 (longest Sobriety)  Hx x of Withdrawal Seizures: No   Psyhx:  Depression/Anxiety/ADHD/Mood D/o             Denies any S/H Ideation or A/V Hallucination             Compliant with Depakote and Wellbutrin  Screening history	Last tested	Result	History of treatment	  HIV	5/08/18	NEG	N/A	  Hepatitis C	5/08/18	NEG	N/A	  Quantiferon GOLD TB test	5/08/18	NEG	N/A	    Immunization	Not Received	Unknown	Received	Date Received 	  Influenza		2017			  Pneumococcus		v			  Tetanus			2017	<10 years	  Others					  					    I-Stop:  Negative

## 2018-05-23 NOTE — H&P ADULT - PROBLEM SELECTOR PLAN 1
admit to Rehab  Thiamine 100mg PO daily  Folic Acid 1mg PO daily  MVI 1 tablet PO daily  Monitor VS and withdrawal symptoms  PRN Medications

## 2018-05-24 LAB
AMPHET UR-MCNC: NEGATIVE — SIGNIFICANT CHANGE UP
BARBITURATES UR SCN-MCNC: NEGATIVE — SIGNIFICANT CHANGE UP
BENZODIAZ UR-MCNC: NEGATIVE — SIGNIFICANT CHANGE UP
COCAINE METAB.OTHER UR-MCNC: NEGATIVE — SIGNIFICANT CHANGE UP
ESTIMATED AVERAGE GLUCOSE: 117 MG/DL — HIGH (ref 68–114)
HBA1C BLD-MCNC: 5.7 % — HIGH (ref 4–5.6)
METHADONE UR-MCNC: NEGATIVE — SIGNIFICANT CHANGE UP
OPIATES UR-MCNC: NEGATIVE — SIGNIFICANT CHANGE UP
PCP SPEC-MCNC: SIGNIFICANT CHANGE UP
PROPOXYPHENE QUALITATIVE URINE RESULT: NEGATIVE — SIGNIFICANT CHANGE UP

## 2018-05-24 RX ADMIN — DIVALPROEX SODIUM 500 MILLIGRAM(S): 500 TABLET, DELAYED RELEASE ORAL at 08:05

## 2018-05-24 RX ADMIN — Medication 1 PATCH: at 08:06

## 2018-05-24 RX ADMIN — Medication 325 MILLIGRAM(S): at 06:31

## 2018-05-24 RX ADMIN — Medication 1 TABLET(S): at 08:05

## 2018-05-24 RX ADMIN — Medication 1 PATCH: at 08:09

## 2018-05-24 RX ADMIN — Medication 100 MILLIGRAM(S): at 21:29

## 2018-05-24 RX ADMIN — Medication 1 APPLICATION(S): at 08:06

## 2018-05-24 RX ADMIN — BUPROPION HYDROCHLORIDE 300 MILLIGRAM(S): 150 TABLET, EXTENDED RELEASE ORAL at 08:05

## 2018-05-24 RX ADMIN — Medication 1 MILLIGRAM(S): at 08:06

## 2018-05-24 RX ADMIN — Medication 100 MILLIGRAM(S): at 08:05

## 2018-05-25 DIAGNOSIS — F10.20 ALCOHOL DEPENDENCE, UNCOMPLICATED: ICD-10-CM

## 2018-05-25 DIAGNOSIS — F17.210 NICOTINE DEPENDENCE, CIGARETTES, UNCOMPLICATED: ICD-10-CM

## 2018-05-25 RX ORDER — MIRTAZAPINE 45 MG/1
15 TABLET, ORALLY DISINTEGRATING ORAL AT BEDTIME
Qty: 0 | Refills: 0 | Status: DISCONTINUED | OUTPATIENT
Start: 2018-05-25 | End: 2018-05-29

## 2018-05-25 RX ADMIN — Medication 1 MILLIGRAM(S): at 08:29

## 2018-05-25 RX ADMIN — Medication 650 MILLIGRAM(S): at 06:26

## 2018-05-25 RX ADMIN — Medication 1 TABLET(S): at 08:29

## 2018-05-25 RX ADMIN — Medication 1 PATCH: at 08:30

## 2018-05-25 RX ADMIN — DIVALPROEX SODIUM 500 MILLIGRAM(S): 500 TABLET, DELAYED RELEASE ORAL at 08:31

## 2018-05-25 RX ADMIN — Medication 50 MILLIGRAM(S): at 09:29

## 2018-05-25 RX ADMIN — MIRTAZAPINE 15 MILLIGRAM(S): 45 TABLET, ORALLY DISINTEGRATING ORAL at 20:45

## 2018-05-25 RX ADMIN — Medication 100 MILLIGRAM(S): at 08:29

## 2018-05-25 RX ADMIN — Medication 1 PATCH: at 09:30

## 2018-05-25 RX ADMIN — BUPROPION HYDROCHLORIDE 300 MILLIGRAM(S): 150 TABLET, EXTENDED RELEASE ORAL at 08:29

## 2018-05-25 NOTE — BEHAVIORAL HEALTH ASSESSMENT NOTE - HPI (INCLUDE ILLNESS QUALITY, SEVERITY, DURATION, TIMING, CONTEXT, MODIFYING FACTORS, ASSOCIATED SIGNS AND SYMPTOMS)
29 yo SWM on SSD but some employment, domiciled, stimulant/canabis/etoh use disorders w psych history presents to rehab from . Pt reports drug use started with mj in midteens. Use became regular at age 19. Pt reports other substances such as pcp, meth age 25, crack cocaine use started age 27. 27 yo SWM on SSD but some employment, domiciled, stimulant/canabis/etoh use disorders w psych history presents to rehab from . Pt reports drug use started with mj in midteens. Use became regular at age 19. Pt reports other substances such as pcp, meth age 25, crack cocaine use started age 27. Pt first sought intervention with addiction age 25 with outpt care until recently in May of 2018, presented to Detox and was d/c'd 5/11/18 and this is first rehab.  Pt's motivation for rehab/detox more recently is "I don't want to go to residential! I don't want to lose my housing, my car, my parents". Pt also reports wanting to live longer "I'm not ready to die".  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 Pt admits to IPP stay x1 but admits "I wrapped a belt around my neck and walked in to ER so I could have a place to sleep". Pt denies past suicide attempts, admits that the er presentation was malingering for purpose of food and shelter. Has been diagnosed with depression, adhd, dyslexia and adhd and anxiety. Pt denies past diagnosis of bipolar disorder disorder but has "bipolar tendencies" but then retracts that and says he is not sure he has bipolar tendencies. Pt has been taking depakote for past month and estimates that prior to that he went without it for over a year, prescribed by an outpt therapist. Pt feels depakote does help "keep me calm". Pt denies current SI. Reports mood as "a little agitated" which is why he keeps asking for vistaril to "calm down". Attributes agitation to the environment "I'm not used to being locked down". Pt has had poor sleep, good appetite. Pt's longest sobriety is 4 wks, does not report plan of never using drugs again "my brain is hardwired for pleasure".

## 2018-05-25 NOTE — BEHAVIORAL HEALTH ASSESSMENT NOTE - NSBHSOCIALHXDETAILSFT_PSY_A_CORE
SSD for dyslexia, adhd and being "mildly retarded" which he reports  diagnosed him with  7 yo daughter, no contact declared by   Last worked in 2 months working as  for [employer]

## 2018-05-26 LAB — VALPROATE SERPL-MCNC: 34 UG/ML — LOW (ref 50–100)

## 2018-05-26 RX ADMIN — Medication 1 APPLICATION(S): at 21:06

## 2018-05-26 RX ADMIN — Medication 1 MILLIGRAM(S): at 08:18

## 2018-05-26 RX ADMIN — Medication 1 PATCH: at 08:18

## 2018-05-26 RX ADMIN — DIVALPROEX SODIUM 500 MILLIGRAM(S): 500 TABLET, DELAYED RELEASE ORAL at 08:18

## 2018-05-26 RX ADMIN — BUPROPION HYDROCHLORIDE 300 MILLIGRAM(S): 150 TABLET, EXTENDED RELEASE ORAL at 08:18

## 2018-05-26 RX ADMIN — Medication 1 TABLET(S): at 08:18

## 2018-05-26 RX ADMIN — MIRTAZAPINE 15 MILLIGRAM(S): 45 TABLET, ORALLY DISINTEGRATING ORAL at 21:06

## 2018-05-26 RX ADMIN — Medication 100 MILLIGRAM(S): at 08:18

## 2018-05-27 RX ORDER — CEPHALEXIN 500 MG
500 CAPSULE ORAL EVERY 12 HOURS
Qty: 0 | Refills: 0 | Status: DISCONTINUED | OUTPATIENT
Start: 2018-05-27 | End: 2018-05-29

## 2018-05-27 RX ADMIN — Medication 500 MILLIGRAM(S): at 21:19

## 2018-05-27 RX ADMIN — Medication 1 TABLET(S): at 08:18

## 2018-05-27 RX ADMIN — BUPROPION HYDROCHLORIDE 300 MILLIGRAM(S): 150 TABLET, EXTENDED RELEASE ORAL at 08:18

## 2018-05-27 RX ADMIN — DIVALPROEX SODIUM 500 MILLIGRAM(S): 500 TABLET, DELAYED RELEASE ORAL at 08:18

## 2018-05-27 RX ADMIN — Medication 50 MILLIGRAM(S): at 17:16

## 2018-05-27 RX ADMIN — Medication 1 MILLIGRAM(S): at 08:18

## 2018-05-27 RX ADMIN — Medication 650 MILLIGRAM(S): at 07:38

## 2018-05-27 RX ADMIN — Medication 500 MILLIGRAM(S): at 11:16

## 2018-05-27 RX ADMIN — MIRTAZAPINE 15 MILLIGRAM(S): 45 TABLET, ORALLY DISINTEGRATING ORAL at 21:19

## 2018-05-27 RX ADMIN — Medication 100 MILLIGRAM(S): at 08:18

## 2018-05-27 RX ADMIN — Medication 50 MILLIGRAM(S): at 11:16

## 2018-05-27 RX ADMIN — Medication 1 PATCH: at 08:17

## 2018-05-27 NOTE — CHART NOTE - NSCHARTNOTEFT_GEN_A_CORE
Called by RN to evaluate patient  complaint of "cyst" in right axillae.  Patient reports that he has reoccurring sebaceous cyst that oft times get inflamed.  He states he first noticed this particular one approximately 1 week ago.  He states it is getting larger.  + pain to area.  No fever/erythema noted.  On exam there is a flesh colored papule.  Mild erythema noted to area.  + pain to palpation.  No fluctuance.  Patient states usually treated with ABX.  Will RX keflex 500mg PO q12h x 10 days along with warm compresses to area TID.  Will monitor.

## 2018-05-28 RX ADMIN — Medication 50 MILLIGRAM(S): at 16:04

## 2018-05-28 RX ADMIN — Medication 500 MILLIGRAM(S): at 08:25

## 2018-05-28 RX ADMIN — Medication 100 MILLIGRAM(S): at 08:25

## 2018-05-28 RX ADMIN — BUPROPION HYDROCHLORIDE 300 MILLIGRAM(S): 150 TABLET, EXTENDED RELEASE ORAL at 08:25

## 2018-05-28 RX ADMIN — Medication 500 MILLIGRAM(S): at 20:53

## 2018-05-28 RX ADMIN — Medication 1 TABLET(S): at 08:25

## 2018-05-28 RX ADMIN — Medication 1 PATCH: at 08:25

## 2018-05-28 RX ADMIN — Medication 1 PATCH: at 08:26

## 2018-05-28 RX ADMIN — Medication 1 MILLIGRAM(S): at 08:25

## 2018-05-28 RX ADMIN — MIRTAZAPINE 15 MILLIGRAM(S): 45 TABLET, ORALLY DISINTEGRATING ORAL at 20:53

## 2018-05-28 RX ADMIN — Medication 50 MILLIGRAM(S): at 21:17

## 2018-05-29 VITALS
SYSTOLIC BLOOD PRESSURE: 138 MMHG | RESPIRATION RATE: 18 BRPM | DIASTOLIC BLOOD PRESSURE: 90 MMHG | TEMPERATURE: 96 F | HEART RATE: 88 BPM

## 2018-05-29 RX ORDER — ACETAMINOPHEN 500 MG
2 TABLET ORAL
Qty: 0 | Refills: 0 | COMMUNITY
Start: 2018-05-29

## 2018-05-29 RX ORDER — MIRTAZAPINE 45 MG/1
1 TABLET, ORALLY DISINTEGRATING ORAL
Qty: 0 | Refills: 0 | COMMUNITY
Start: 2018-05-29

## 2018-05-29 RX ADMIN — Medication 1 MILLIGRAM(S): at 07:43

## 2018-05-29 RX ADMIN — DIVALPROEX SODIUM 500 MILLIGRAM(S): 500 TABLET, DELAYED RELEASE ORAL at 07:44

## 2018-05-29 RX ADMIN — Medication 100 MILLIGRAM(S): at 07:45

## 2018-05-29 RX ADMIN — Medication 1 PATCH: at 07:45

## 2018-05-29 RX ADMIN — Medication 500 MILLIGRAM(S): at 07:42

## 2018-05-29 RX ADMIN — Medication 1 TABLET(S): at 07:45

## 2018-05-29 RX ADMIN — BUPROPION HYDROCHLORIDE 300 MILLIGRAM(S): 150 TABLET, EXTENDED RELEASE ORAL at 07:43

## 2018-05-29 NOTE — CHART NOTE - NSCHARTNOTEFT_GEN_A_CORE
The patient was admitted to the inpt  unit CDRU, for   ETOH__x__ Opioid___  Benzo____Polysubstance _____ Dependency.    Pt was admitted from ED____, Intake__x__, Med/surg Floor_______.    Details are present in the preceding History & Physical section and follow up chart notes.  patient was evaluated on daily detox team  rounds.  Withdrawal symptoms and signs were reviewed on a prn  basis, and the meds were adjusted accordingly.    Labs and imaging results were reviewed and discussed with the patient.    All questions from the patient were addressed.  The patient was seen by the Chemical dependency counselors, and different options for after care were discussed.  The patient attended groups, meetings and 1:1 sessions with the counselors.  Narcane Kit was offered and instructions given prior to discharge.    Psychiatry consultation reviewed___x___, N/A______    Physical therapy evaluation reviewed_____, N/A__x__    Pt was given copies of labs and imaging reports, if applicable.    Prescriptions if needed, were sent through ERx system to the pharmacy amnd are noted in the discharge instruction sheet.    After care was arranged by counselors and pt was discharged to:    Home_x_, Outpt. Program_x__, Rehab ___, Long term____ [facility] ____ [facility]____ SNF____, AMA___, Admin Discharge____    Principal Diagnosis: Alcohol Dependency__x__ Opioid Dependency___ Benzo Dependency____ Polysubstance Dependency____

## 2018-06-01 DIAGNOSIS — I10 ESSENTIAL (PRIMARY) HYPERTENSION: ICD-10-CM
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
## 2018-06-01 DIAGNOSIS — F10.20 ALCOHOL DEPENDENCE, UNCOMPLICATED: ICD-10-CM

## 2018-06-01 DIAGNOSIS — B35.3 TINEA PEDIS: ICD-10-CM

## 2018-06-01 DIAGNOSIS — Z51.89 ENCOUNTER FOR OTHER SPECIFIED AFTERCARE: ICD-10-CM

## 2018-06-01 DIAGNOSIS — F17.210 NICOTINE DEPENDENCE, CIGARETTES, UNCOMPLICATED: ICD-10-CM

## 2018-06-01 DIAGNOSIS — F12.20 CANNABIS DEPENDENCE, UNCOMPLICATED: ICD-10-CM

## 2018-06-01 DIAGNOSIS — E78.00 PURE HYPERCHOLESTEROLEMIA, UNSPECIFIED: ICD-10-CM

## 2018-09-27 ENCOUNTER — EMERGENCY (EMERGENCY)
Facility: HOSPITAL | Age: 28
LOS: 0 days | Discharge: HOME | End: 2018-09-27
Attending: STUDENT IN AN ORGANIZED HEALTH CARE EDUCATION/TRAINING PROGRAM | Admitting: STUDENT IN AN ORGANIZED HEALTH CARE EDUCATION/TRAINING PROGRAM

## 2018-09-27 VITALS
RESPIRATION RATE: 20 BRPM | DIASTOLIC BLOOD PRESSURE: 91 MMHG | SYSTOLIC BLOOD PRESSURE: 136 MMHG | OXYGEN SATURATION: 99 % | TEMPERATURE: 98 F | HEART RATE: 106 BPM

## 2018-09-27 DIAGNOSIS — E78.00 PURE HYPERCHOLESTEROLEMIA, UNSPECIFIED: ICD-10-CM

## 2018-09-27 DIAGNOSIS — Z79.899 OTHER LONG TERM (CURRENT) DRUG THERAPY: ICD-10-CM

## 2018-09-27 DIAGNOSIS — I10 ESSENTIAL (PRIMARY) HYPERTENSION: ICD-10-CM

## 2018-09-27 DIAGNOSIS — S41.101A UNSPECIFIED OPEN WOUND OF RIGHT UPPER ARM, INITIAL ENCOUNTER: ICD-10-CM
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
## 2018-09-27 DIAGNOSIS — Z98.890 OTHER SPECIFIED POSTPROCEDURAL STATES: Chronic | ICD-10-CM
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
## 2018-09-27 DIAGNOSIS — Z79.1 LONG TERM (CURRENT) USE OF NON-STEROIDAL ANTI-INFLAMMATORIES (NSAID): ICD-10-CM

## 2018-09-27 DIAGNOSIS — Z98.890 OTHER SPECIFIED POSTPROCEDURAL STATES: ICD-10-CM

## 2018-09-27 DIAGNOSIS — F32.9 MAJOR DEPRESSIVE DISORDER, SINGLE EPISODE, UNSPECIFIED: ICD-10-CM
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
## 2018-09-27 DIAGNOSIS — F18.20 INHALANT DEPENDENCE, UNCOMPLICATED: ICD-10-CM

## 2018-09-27 PROBLEM — F12.20 CANNABIS DEPENDENCE, UNCOMPLICATED: Chronic | Status: ACTIVE | Noted: 2018-05-23

## 2018-09-27 PROBLEM — F17.200 NICOTINE DEPENDENCE, UNSPECIFIED, UNCOMPLICATED: Chronic | Status: ACTIVE | Noted: 2018-05-08

## 2018-09-27 PROBLEM — F16.10 HALLUCINOGEN ABUSE, UNCOMPLICATED: Chronic | Status: ACTIVE | Noted: 2018-05-23

## 2018-09-27 PROBLEM — F90.9 ATTENTION-DEFICIT HYPERACTIVITY DISORDER, UNSPECIFIED TYPE: Chronic | Status: ACTIVE | Noted: 2018-05-23

## 2018-09-27 PROBLEM — F41.9 ANXIETY DISORDER, UNSPECIFIED: Chronic | Status: ACTIVE | Noted: 2018-05-08

## 2018-09-27 PROBLEM — B35.3 TINEA PEDIS: Chronic | Status: ACTIVE | Noted: 2018-05-23

## 2018-09-27 PROBLEM — F39 UNSPECIFIED MOOD [AFFECTIVE] DISORDER: Chronic | Status: ACTIVE | Noted: 2018-05-08

## 2018-09-27 LAB
ALBUMIN SERPL ELPH-MCNC: 4.3 G/DL — SIGNIFICANT CHANGE UP (ref 3.5–5.2)
ALP SERPL-CCNC: 102 U/L — SIGNIFICANT CHANGE UP (ref 30–115)
ALT FLD-CCNC: 40 U/L — SIGNIFICANT CHANGE UP (ref 0–41)
ANION GAP SERPL CALC-SCNC: 21 MMOL/L — HIGH (ref 7–14)
APAP SERPL-MCNC: <5 UG/ML — LOW (ref 10–30)
AST SERPL-CCNC: 49 U/L — HIGH (ref 0–41)
BASOPHILS # BLD AUTO: 0.02 K/UL — SIGNIFICANT CHANGE UP (ref 0–0.2)
BASOPHILS NFR BLD AUTO: 0.2 % — SIGNIFICANT CHANGE UP (ref 0–1)
BILIRUB SERPL-MCNC: 0.4 MG/DL — SIGNIFICANT CHANGE UP (ref 0.2–1.2)
BUN SERPL-MCNC: 11 MG/DL — SIGNIFICANT CHANGE UP (ref 10–20)
CALCIUM SERPL-MCNC: 9.6 MG/DL — SIGNIFICANT CHANGE UP (ref 8.5–10.1)
CHLORIDE SERPL-SCNC: 93 MMOL/L — LOW (ref 98–110)
CO2 SERPL-SCNC: 22 MMOL/L — SIGNIFICANT CHANGE UP (ref 17–32)
CREAT SERPL-MCNC: 0.8 MG/DL — SIGNIFICANT CHANGE UP (ref 0.7–1.5)
EOSINOPHIL # BLD AUTO: 0.01 K/UL — SIGNIFICANT CHANGE UP (ref 0–0.7)
EOSINOPHIL NFR BLD AUTO: 0.1 % — SIGNIFICANT CHANGE UP (ref 0–8)
ETHANOL SERPL-MCNC: <10 MG/DL — HIGH
GLUCOSE SERPL-MCNC: 148 MG/DL — HIGH (ref 70–99)
HCT VFR BLD CALC: 33.6 % — LOW (ref 42–52)
HGB BLD-MCNC: 11.7 G/DL — LOW (ref 14–18)
IMM GRANULOCYTES NFR BLD AUTO: 0.2 % — SIGNIFICANT CHANGE UP (ref 0.1–0.3)
LYMPHOCYTES # BLD AUTO: 1.21 K/UL — SIGNIFICANT CHANGE UP (ref 1.2–3.4)
LYMPHOCYTES # BLD AUTO: 12.4 % — LOW (ref 20.5–51.1)
MCHC RBC-ENTMCNC: 29 PG — SIGNIFICANT CHANGE UP (ref 27–31)
MCHC RBC-ENTMCNC: 34.8 G/DL — SIGNIFICANT CHANGE UP (ref 32–37)
MCV RBC AUTO: 83.4 FL — SIGNIFICANT CHANGE UP (ref 80–94)
MONOCYTES # BLD AUTO: 0.53 K/UL — SIGNIFICANT CHANGE UP (ref 0.1–0.6)
MONOCYTES NFR BLD AUTO: 5.4 % — SIGNIFICANT CHANGE UP (ref 1.7–9.3)
NEUTROPHILS # BLD AUTO: 7.96 K/UL — HIGH (ref 1.4–6.5)
NEUTROPHILS NFR BLD AUTO: 81.7 % — HIGH (ref 42.2–75.2)
PCP SPEC-MCNC: SIGNIFICANT CHANGE UP
PLATELET # BLD AUTO: 250 K/UL — SIGNIFICANT CHANGE UP (ref 130–400)
POTASSIUM SERPL-MCNC: 5.2 MMOL/L — HIGH (ref 3.5–5)
POTASSIUM SERPL-SCNC: 5.2 MMOL/L — HIGH (ref 3.5–5)
PROT SERPL-MCNC: 7.1 G/DL — SIGNIFICANT CHANGE UP (ref 6–8)
RBC # BLD: 4.03 M/UL — LOW (ref 4.7–6.1)
RBC # FLD: 12 % — SIGNIFICANT CHANGE UP (ref 11.5–14.5)
SALICYLATES SERPL-MCNC: <0.3 MG/DL — LOW (ref 4–30)
SODIUM SERPL-SCNC: 136 MMOL/L — SIGNIFICANT CHANGE UP (ref 135–146)
WBC # BLD: 9.75 K/UL — SIGNIFICANT CHANGE UP (ref 4.8–10.8)
WBC # FLD AUTO: 9.75 K/UL — SIGNIFICANT CHANGE UP (ref 4.8–10.8)

## 2018-09-27 RX ORDER — SODIUM CHLORIDE 9 MG/ML
1000 INJECTION, SOLUTION INTRAVENOUS ONCE
Qty: 0 | Refills: 0 | Status: COMPLETED | OUTPATIENT
Start: 2018-09-27 | End: 2018-09-27

## 2018-09-27 RX ORDER — KETOROLAC TROMETHAMINE 30 MG/ML
15 SYRINGE (ML) INJECTION ONCE
Qty: 0 | Refills: 0 | Status: DISCONTINUED | OUTPATIENT
Start: 2018-09-27 | End: 2018-09-27

## 2018-09-27 RX ADMIN — Medication 15 MILLIGRAM(S): at 12:31

## 2018-09-27 RX ADMIN — SODIUM CHLORIDE 2000 MILLILITER(S): 9 INJECTION, SOLUTION INTRAVENOUS at 12:31
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
## 2018-09-27 NOTE — ED PROVIDER NOTE - PHYSICAL EXAMINATION
AOx4, Non toxic appearing, NAD, speaking in full sentences. Skin - large linear well healing laceration site extending from distal upper arm to right distal forearm, sutures intact, no active drainage, no signs of infection or cellulitis. Head normocephalic, atraumatic. PERRLA/EOMI, conjunctiva and sclera clear. MM moist, no nasal discharge.  Pharynx unremarkable.  No mastoid or temporal ttp. Neck supple nt, no meningeal signs. Heart RRR s1s2 nl, no rub/murmur. Lungs- No retractions, BS equal, CTAB. Abdomen soft ntnd no r/g. Extremities- moves all, +equal distal pulses, brisk cap refill, sensation wnl, normal ROM. l.

## 2018-09-27 NOTE — SBIRT NOTE. - NSSBIRTSERVICES_GEN_A_ED_FT
Provided SBIRT services:  Full Screen Positive. Brief Intervention Performed and Referral to Treatment Attempted.  Screening results were reviewed with the patient and patient was provided information about healthy guidelines and potential negative consequences associated with level of risk. Motivation and readiness to reduce or stop use was discussed and goals and activities to make changes were suggested/offered.   Options discussed for further evaluation and treatment, but referral to treatment was not completed because patient requires medical care and patient is currently in treatment, at [facility], [phone].  AUDIT Score: 6  DAST-10 Score: 5  Duration = # Minutes

## 2018-09-27 NOTE — ED ADULT NURSE NOTE - PMH
ADHD    Anxiety    Cannabis dependence    HTN (hypertension)    Huffing    Hypercholesterolemia    Mood disorder    Nicotine dependence    PCP abuse    Tinea pedis
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
## 2018-09-27 NOTE — ED PROVIDER NOTE - NS ED ROS FT
Constitutional: See HPI.  Eyes: No visual changes, eye pain or discharge. No Photophobia  ENMT: . No neck pain or stiffness. No limited ROM  Cardiac: No SOB or edema. No chest pain with exertion.  Respiratory: No cough or respiratory distress. No hemoptysis. No history of asthma or RAD.  GI: No nausea, vomiting, diarrhea or abdominal pain.  : No dysuria, frequency or burning. No Discharge  MS: see hpi  Neuro: No headache or weakness. No LOC.  Skin: No skin rash.  Except as documented in the HPI, all other systems are negative.

## 2018-09-27 NOTE — ED ADULT NURSE REASSESSMENT NOTE - NS ED NURSE REASSESS COMMENT FT1
Patient assessed. States pain has improved. Dressing clean, dry and intact. Arm in sling. Patient fed and drank water. Awaiting transport home.

## 2018-09-27 NOTE — ED PROVIDER NOTE - PROGRESS NOTE DETAILS
Wound redressed, clean, adaptic applied. Patient will see [facility] surgeon for f/u.
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
## 2018-10-25 ENCOUNTER — INPATIENT (INPATIENT)
Facility: HOSPITAL | Age: 28
LOS: 13 days | Discharge: NURSING, REHAB OR LONG-TERM CARE | End: 2018-11-08
Attending: PSYCHIATRY & NEUROLOGY | Admitting: PSYCHIATRY & NEUROLOGY

## 2018-10-25 VITALS
RESPIRATION RATE: 20 BRPM | TEMPERATURE: 97 F | SYSTOLIC BLOOD PRESSURE: 145 MMHG | DIASTOLIC BLOOD PRESSURE: 87 MMHG | HEART RATE: 98 BPM | OXYGEN SATURATION: 100 %

## 2018-10-25 DIAGNOSIS — F18.10 INHALANT ABUSE, UNCOMPLICATED: ICD-10-CM

## 2018-10-25 DIAGNOSIS — F32.9 MAJOR DEPRESSIVE DISORDER, SINGLE EPISODE, UNSPECIFIED: ICD-10-CM

## 2018-10-25 DIAGNOSIS — Z98.890 OTHER SPECIFIED POSTPROCEDURAL STATES: Chronic | ICD-10-CM

## 2018-10-25 LAB
ALBUMIN SERPL ELPH-MCNC: 5.1 G/DL — SIGNIFICANT CHANGE UP (ref 3.5–5.2)
ALP SERPL-CCNC: 105 U/L — SIGNIFICANT CHANGE UP (ref 30–115)
ALT FLD-CCNC: 25 U/L — SIGNIFICANT CHANGE UP (ref 0–41)
ANION GAP SERPL CALC-SCNC: 17 MMOL/L — HIGH (ref 7–14)
APAP SERPL-MCNC: <5 UG/ML — LOW (ref 10–30)
AST SERPL-CCNC: 20 U/L — SIGNIFICANT CHANGE UP (ref 0–41)
BASOPHILS # BLD AUTO: 0.07 K/UL — SIGNIFICANT CHANGE UP (ref 0–0.2)
BASOPHILS NFR BLD AUTO: 1 % — SIGNIFICANT CHANGE UP (ref 0–1)
BILIRUB SERPL-MCNC: <0.2 MG/DL — SIGNIFICANT CHANGE UP (ref 0.2–1.2)
BUN SERPL-MCNC: 12 MG/DL — SIGNIFICANT CHANGE UP (ref 10–20)
CALCIUM SERPL-MCNC: 10 MG/DL — SIGNIFICANT CHANGE UP (ref 8.5–10.1)
CHLORIDE SERPL-SCNC: 99 MMOL/L — SIGNIFICANT CHANGE UP (ref 98–110)
CO2 SERPL-SCNC: 27 MMOL/L — SIGNIFICANT CHANGE UP (ref 17–32)
CREAT SERPL-MCNC: 0.9 MG/DL — SIGNIFICANT CHANGE UP (ref 0.7–1.5)
EOSINOPHIL # BLD AUTO: 0.22 K/UL — SIGNIFICANT CHANGE UP (ref 0–0.7)
EOSINOPHIL NFR BLD AUTO: 3.1 % — SIGNIFICANT CHANGE UP (ref 0–8)
ETHANOL SERPL-MCNC: <10 MG/DL — HIGH
GLUCOSE SERPL-MCNC: 125 MG/DL — HIGH (ref 70–99)
HCT VFR BLD CALC: 42.5 % — SIGNIFICANT CHANGE UP (ref 42–52)
HGB BLD-MCNC: 13.8 G/DL — LOW (ref 14–18)
IMM GRANULOCYTES NFR BLD AUTO: 0.1 % — SIGNIFICANT CHANGE UP (ref 0.1–0.3)
LYMPHOCYTES # BLD AUTO: 2.14 K/UL — SIGNIFICANT CHANGE UP (ref 1.2–3.4)
LYMPHOCYTES # BLD AUTO: 30.3 % — SIGNIFICANT CHANGE UP (ref 20.5–51.1)
MCHC RBC-ENTMCNC: 27 PG — SIGNIFICANT CHANGE UP (ref 27–31)
MCHC RBC-ENTMCNC: 32.5 G/DL — SIGNIFICANT CHANGE UP (ref 32–37)
MCV RBC AUTO: 83.2 FL — SIGNIFICANT CHANGE UP (ref 80–94)
MONOCYTES # BLD AUTO: 0.35 K/UL — SIGNIFICANT CHANGE UP (ref 0.1–0.6)
MONOCYTES NFR BLD AUTO: 5 % — SIGNIFICANT CHANGE UP (ref 1.7–9.3)
NEUTROPHILS # BLD AUTO: 4.28 K/UL — SIGNIFICANT CHANGE UP (ref 1.4–6.5)
NEUTROPHILS NFR BLD AUTO: 60.5 % — SIGNIFICANT CHANGE UP (ref 42.2–75.2)
NRBC # BLD: 0 /100 WBCS — SIGNIFICANT CHANGE UP (ref 0–0)
PLATELET # BLD AUTO: 246 K/UL — SIGNIFICANT CHANGE UP (ref 130–400)
POTASSIUM SERPL-MCNC: 3.9 MMOL/L — SIGNIFICANT CHANGE UP (ref 3.5–5)
POTASSIUM SERPL-SCNC: 3.9 MMOL/L — SIGNIFICANT CHANGE UP (ref 3.5–5)
PROT SERPL-MCNC: 7.7 G/DL — SIGNIFICANT CHANGE UP (ref 6–8)
RBC # BLD: 5.11 M/UL — SIGNIFICANT CHANGE UP (ref 4.7–6.1)
RBC # FLD: 12.8 % — SIGNIFICANT CHANGE UP (ref 11.5–14.5)
SALICYLATES SERPL-MCNC: <0.3 MG/DL — LOW (ref 4–30)
SODIUM SERPL-SCNC: 143 MMOL/L — SIGNIFICANT CHANGE UP (ref 135–146)
WBC # BLD: 7.07 K/UL — SIGNIFICANT CHANGE UP (ref 4.8–10.8)
WBC # FLD AUTO: 7.07 K/UL — SIGNIFICANT CHANGE UP (ref 4.8–10.8)

## 2018-10-25 NOTE — ED BEHAVIORAL HEALTH ASSESSMENT NOTE - OTHER
on SSD not assessed patient reports hearing voices telling him to "die", response to internal stimuli not evidenced during interview

## 2018-10-25 NOTE — ED PROVIDER NOTE - PHYSICAL EXAMINATION
VITAL SIGNS: I have reviewed nursing notes and confirm.  CONSTITUTIONAL: Well-developed; well-nourished; in no acute distress. disheveled appearing, ambulating in ED, conversing appropriately   SKIN: Skin exam is warm and dry  HEAD: Normocephalic; atraumatic.  EYES: EOM intact; conjunctiva and sclera clear.  ENT: No nasal discharge; airway clear.   NECK: Supple; non tender.  CARD: S1, S2 normal; Regular rate and rhythm.  RESP: No wheezes, rales or rhonchi.  ABD: Normal bowel sounds; soft; non-distended; non-tender  EXT: Normal ROM. No LE edema. well healing scar to R arm   NEURO: Alert, oriented. Grossly unremarkable. No focal deficits.  PSYCH: Cooperative, appropriate.

## 2018-10-25 NOTE — ED ADULT TRIAGE NOTE - CHIEF COMPLAINT QUOTE
As per EMS, patient has been homeless x 1 week. EMS states Patient sniffs aerosol cans and wants detox from it. Denies suicidal ideations

## 2018-10-25 NOTE — ED BEHAVIORAL HEALTH ASSESSMENT NOTE - RISK ASSESSMENT
Patient is considered a high risk as patient is a single, white, male, with mental illness history, family history, poor follow up with psychiatry, poor social support and having suicidal ideation.
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
## 2018-10-25 NOTE — ED BEHAVIORAL HEALTH ASSESSMENT NOTE - OTHER PAST PSYCHIATRIC HISTORY (INCLUDE DETAILS REGARDING ONSET, COURSE OF ILLNESS, INPATIENT/OUTPATIENT TREATMENT)
anxiety and PTSD, several IPP admissions including 1 about 1 month ago for 2 weeks for SI, several SAs
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
## 2018-10-25 NOTE — ED PROVIDER NOTE - OBJECTIVE STATEMENT
28ym + psych and substance abuse hx presents w SI. States that he would like to jump in front of train. No HI. No hallucinations. + etoh and huffing for which he would like detox. No firearm. Undomiciled. No acute trauma. No somatic c/o.

## 2018-10-25 NOTE — ED BEHAVIORAL HEALTH ASSESSMENT NOTE - AXIS IV
Problems with primary support/Occupational problems/Problem related to social environment/Housing problems/Economic problems/Problems with access to healthcare services

## 2018-10-25 NOTE — ED BEHAVIORAL HEALTH ASSESSMENT NOTE - SUMMARY
Patient is a 28 year old male pphx of anxiety, ptsd with no established follow up, presents with worsening depressive symptoms, including SI, poor sleep, and hopelessness. Upon examination patient is calm and cooperative, linear, with congruent, anxious affect, and endorses suicidal thoughts and auditory hallucinations. Given current presentation, patient appears to meet criteria for moderate/severe MDD, and may require inpatient psychiatric admission for stabilization and safety.

## 2018-10-25 NOTE — ED ADULT NURSE NOTE - NSIMPLEMENTINTERV_GEN_ALL_ED
Implemented All Universal Safety Interventions:  [facility] to call system. Call bell, personal items and telephone within reach. Instruct patient to call for assistance. Room bathroom lighting operational. Non-slip footwear when patient is off stretcher. Physically safe environment: no spills, clutter or unnecessary equipment. Stretcher in lowest position, wheels locked, appropriate side rails in place.

## 2018-10-26 DIAGNOSIS — E78.00 PURE HYPERCHOLESTEROLEMIA, UNSPECIFIED: ICD-10-CM

## 2018-10-26 DIAGNOSIS — F17.200 NICOTINE DEPENDENCE, UNSPECIFIED, UNCOMPLICATED: ICD-10-CM

## 2018-10-26 DIAGNOSIS — R45.851 SUICIDAL IDEATIONS: ICD-10-CM

## 2018-10-26 DIAGNOSIS — I10 ESSENTIAL (PRIMARY) HYPERTENSION: ICD-10-CM

## 2018-10-26 RX ORDER — BUPROPION HYDROCHLORIDE 150 MG/1
150 TABLET, EXTENDED RELEASE ORAL DAILY
Qty: 0 | Refills: 0 | Status: DISCONTINUED | OUTPATIENT
Start: 2018-10-26 | End: 2018-10-30

## 2018-10-26 RX ORDER — ACETAMINOPHEN 500 MG
650 TABLET ORAL EVERY 6 HOURS
Qty: 0 | Refills: 0 | Status: DISCONTINUED | OUTPATIENT
Start: 2018-10-26 | End: 2018-11-06

## 2018-10-26 RX ORDER — QUETIAPINE FUMARATE 200 MG/1
50 TABLET, FILM COATED ORAL EVERY 8 HOURS
Qty: 0 | Refills: 0 | Status: DISCONTINUED | OUTPATIENT
Start: 2018-10-26 | End: 2018-11-05

## 2018-10-26 RX ORDER — IBUPROFEN 200 MG
400 TABLET ORAL THREE TIMES A DAY
Qty: 0 | Refills: 0 | Status: DISCONTINUED | OUTPATIENT
Start: 2018-10-26 | End: 2018-11-06

## 2018-10-26 RX ORDER — TRAZODONE HCL 50 MG
100 TABLET ORAL AT BEDTIME
Qty: 0 | Refills: 0 | Status: DISCONTINUED | OUTPATIENT
Start: 2018-10-26 | End: 2018-10-31

## 2018-10-26 RX ORDER — INFLUENZA VIRUS VACCINE 15; 15; 15; 15 UG/.5ML; UG/.5ML; UG/.5ML; UG/.5ML
0.5 SUSPENSION INTRAMUSCULAR ONCE
Qty: 0 | Refills: 0 | Status: COMPLETED | OUTPATIENT
Start: 2018-10-26 | End: 2018-10-30

## 2018-10-26 RX ORDER — NICOTINE POLACRILEX 2 MG
1 GUM BUCCAL DAILY
Qty: 0 | Refills: 0 | Status: DISCONTINUED | OUTPATIENT
Start: 2018-10-26 | End: 2018-11-08

## 2018-10-26 RX ORDER — PANTOPRAZOLE SODIUM 20 MG/1
40 TABLET, DELAYED RELEASE ORAL
Qty: 0 | Refills: 0 | Status: DISCONTINUED | OUTPATIENT
Start: 2018-10-26 | End: 2018-11-08

## 2018-10-26 RX ADMIN — Medication 400 MILLIGRAM(S): at 17:02

## 2018-10-26 RX ADMIN — QUETIAPINE FUMARATE 50 MILLIGRAM(S): 200 TABLET, FILM COATED ORAL at 22:07

## 2018-10-26 RX ADMIN — Medication 1 PATCH: at 09:27

## 2018-10-26 RX ADMIN — Medication 100 MILLIGRAM(S): at 21:31
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
## 2018-10-26 NOTE — H&P ADULT - NSHPLABSRESULTS_GEN_ALL_CORE
13.8   7.07  )-----------( 246      ( 25 Oct 2018 18:50 )             42.5       10-25    143  |  99  |  12  ----------------------------<  125<H>  3.9   |  27  |  0.9    Ca    10.0      25 Oct 2018 18:50    TPro  7.7  /  Alb  5.1  /  TBili  <0.2  /  DBili  x   /  AST  20  /  ALT  25  /  AlkPhos  105  10-25                      Lactate Trend            CAPILLARY BLOOD GLUCOSE

## 2018-10-26 NOTE — PROGRESS NOTE BEHAVIORAL HEALTH - MODIFICATIONS
seen/discussed with resident; some aspects, including compliance are inconsistent and require clarification.  will continue wellbutrin for now. Pt awaiting placement in long term drug rehab.
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
## 2018-10-26 NOTE — PATIENT PROFILE BEHAVIORAL HEALTH - REASON FOR ADMISSION
Pt was found by police walking on the train tracks. Pt was walking the tracks with the intention of getting hit by a train and drying. Pt states he has done this before

## 2018-10-26 NOTE — PROGRESS NOTE BEHAVIORAL HEALTH - NSBHADDHXSUBSTFT_PSY_A_CORE
Tobacco smoking- less than 1pack/day for last 13 years. Patient also vapes.  Crack- 2017 3x/day for 3days/week
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
## 2018-10-26 NOTE — H&P ADULT - NSHPPHYSICALEXAM_GEN_ALL_CORE
GENERAL:  27y/o Male NAD, resting comfortably.  HEAD:  Atraumatic, Normocephalic  EYES: EOMI, PERRLA, conjunctiva and sclera clear  NECK: Supple, No JVD, no cervical lymphadenopathy, non-tender  CHEST/LUNG: Clear to auscultation bilaterally; No wheeze, rhonchi, or rales  HEART: Regular rate and rhythm; S1&S2  ABDOMEN: Soft, Nontender, Nondistended x 4 quadrants; Bowel sounds present  EXTREMITIES:   Peripheral Pulses Present, No clubbing, no cyanosis, or no edema, no calf tenderness  PSYCH: AAOx3, cooperative, appropriate  NEUROLOGY: WNL  SKIN: WNL

## 2018-10-26 NOTE — CHART NOTE - NSCHARTNOTEFT_GEN_A_CORE
As per patient's request and consent, JOVANNY contacted patient's therapist, Laverne Ndiaye (#[phone]), at [facility] on Friday October 26 to inform therapist that patient was admitted to IPP following accompaniment to [facility] ED by [facility].  Therapist informed SW that patient has been receiving outpatient care at [facility] (therapy and psychiatric medication management) for several months, and has been involved in court mandated treatment.  Therapist reports that patient has upcoming court date on Tuesday 30 October, and that he has a referral for rehabilitation services for substance abuse at [facility]. SW informed therapist that patient will continue to be monitored for mental health stabilization, and will meet with clinical team to determine appropriate discharge plan.

## 2018-10-26 NOTE — PROGRESS NOTE BEHAVIORAL HEALTH - OTHER
not assessed patient reports hearing voices telling him to "die", response to internal stimuli not evidenced during interview
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
## 2018-10-27 RX ORDER — BENZOCAINE AND MENTHOL 5; 1 G/100ML; G/100ML
1 LIQUID ORAL
Qty: 0 | Refills: 0 | Status: DISCONTINUED | OUTPATIENT
Start: 2018-10-27 | End: 2018-11-05

## 2018-10-27 RX ADMIN — Medication 1 PATCH: at 09:00

## 2018-10-27 RX ADMIN — Medication 400 MILLIGRAM(S): at 22:50

## 2018-10-27 RX ADMIN — Medication 1 PATCH: at 08:59

## 2018-10-27 RX ADMIN — BUPROPION HYDROCHLORIDE 150 MILLIGRAM(S): 150 TABLET, EXTENDED RELEASE ORAL at 08:59

## 2018-10-27 RX ADMIN — QUETIAPINE FUMARATE 50 MILLIGRAM(S): 200 TABLET, FILM COATED ORAL at 20:29

## 2018-10-27 RX ADMIN — PANTOPRAZOLE SODIUM 40 MILLIGRAM(S): 20 TABLET, DELAYED RELEASE ORAL at 08:59

## 2018-10-27 RX ADMIN — Medication 1 PATCH: at 17:20

## 2018-10-27 RX ADMIN — Medication 100 MILLIGRAM(S): at 20:29

## 2018-10-27 RX ADMIN — Medication 400 MILLIGRAM(S): at 20:32

## 2018-10-27 RX ADMIN — Medication 400 MILLIGRAM(S): at 12:39

## 2018-10-27 RX ADMIN — Medication 400 MILLIGRAM(S): at 18:47

## 2018-10-28 RX ADMIN — PANTOPRAZOLE SODIUM 40 MILLIGRAM(S): 20 TABLET, DELAYED RELEASE ORAL at 08:24

## 2018-10-28 RX ADMIN — Medication 400 MILLIGRAM(S): at 18:30

## 2018-10-28 RX ADMIN — Medication 1 PATCH: at 08:28

## 2018-10-28 RX ADMIN — Medication 650 MILLIGRAM(S): at 20:16

## 2018-10-28 RX ADMIN — Medication 1 PATCH: at 08:25

## 2018-10-28 RX ADMIN — QUETIAPINE FUMARATE 50 MILLIGRAM(S): 200 TABLET, FILM COATED ORAL at 20:15

## 2018-10-28 RX ADMIN — BUPROPION HYDROCHLORIDE 150 MILLIGRAM(S): 150 TABLET, EXTENDED RELEASE ORAL at 08:24

## 2018-10-28 RX ADMIN — BENZOCAINE AND MENTHOL 1 LOZENGE: 5; 1 LIQUID ORAL at 17:11

## 2018-10-28 RX ADMIN — Medication 100 MILLIGRAM(S): at 20:15

## 2018-10-29 DIAGNOSIS — R48.0 DYSLEXIA AND ALEXIA: ICD-10-CM

## 2018-10-29 DIAGNOSIS — F90.9 ATTENTION-DEFICIT HYPERACTIVITY DISORDER, UNSPECIFIED TYPE: ICD-10-CM

## 2018-10-29 RX ORDER — DIVALPROEX SODIUM 500 MG/1
500 TABLET, DELAYED RELEASE ORAL DAILY
Qty: 0 | Refills: 0 | Status: DISCONTINUED | OUTPATIENT
Start: 2018-10-29 | End: 2018-11-01

## 2018-10-29 RX ADMIN — Medication 1 PATCH: at 08:23

## 2018-10-29 RX ADMIN — PANTOPRAZOLE SODIUM 40 MILLIGRAM(S): 20 TABLET, DELAYED RELEASE ORAL at 08:23

## 2018-10-29 RX ADMIN — Medication 100 MILLIGRAM(S): at 20:31

## 2018-10-29 RX ADMIN — BUPROPION HYDROCHLORIDE 150 MILLIGRAM(S): 150 TABLET, EXTENDED RELEASE ORAL at 08:23

## 2018-10-29 RX ADMIN — QUETIAPINE FUMARATE 50 MILLIGRAM(S): 200 TABLET, FILM COATED ORAL at 20:36

## 2018-10-29 NOTE — CHART NOTE - NSCHARTNOTEFT_GEN_A_CORE
As per patient's request and consent, JOVANNY contacted patient's , Amy Garcia (#[phone]), at [facility] on Monday 29 October to inform  that patient was admitted to IPP. NIKHIL informed JOVANNY that patient has a court date for tomorrow Tuesday 30 October, however if proper documentation is provided that patient has been admitted to IPP, this date can be excused. JOVANNY informed NIKHIL that patient's psychiatrist Dr. Mccollum will be working on letter to fax (#[phone]) for submission.

## 2018-10-29 NOTE — PROGRESS NOTE ADULT - SUBJECTIVE AND OBJECTIVE BOX
pt stable alert in NAD  no new complaints    SUICIDAL IDEATION  ^DRUGS  No pertinent family history in first degree relatives  MEWS Score  Huffing  Hypercholesterolemia  HTN (hypertension)  Tinea pedis  ADHD  PCP abuse  Cannabis dependence  Nicotine dependence  Anxiety  Mood disorder  Suicidal ideation  Hypercholesterolemia  Nicotine dependence  HTN (hypertension)  Suicidal ideation  Inhalant abuse  Major depressive disorder  H/O right inguinal hernia repair  DRUGS  28    HEALTH ISSUES - PROBLEM Dx:  Hypercholesterolemia: Hypercholesterolemia  Nicotine dependence: Nicotine dependence  HTN (hypertension): HTN (hypertension)  Suicidal ideation: Suicidal ideation  Inhalant abuse  Major depressive disorder        PAST MEDICAL & SURGICAL HISTORY:  Huffing  Hypercholesterolemia  HTN (hypertension)  Tinea pedis  ADHD  PCP abuse  Cannabis dependence  Nicotine dependence  Anxiety  Mood disorder  H/O right inguinal hernia repair: at his 5 Y.O  (R)    Demerol (Unknown)      FAMILY HISTORY:  No pertinent family history in first degree relatives      acetaminophen   Tablet .. 650 milliGRAM(s) Oral every 6 hours PRN  aluminum hydroxide/magnesium hydroxide/simethicone Suspension 30 milliLiter(s) Oral every 4 hours PRN  benzocaine 15 mG/menthol 3.6 mG Lozenge 1 Lozenge Oral every 3 hours PRN  buPROPion  milliGRAM(s) Oral daily  ibuprofen  Tablet. 400 milliGRAM(s) Oral three times a day PRN  influenza   Vaccine 0.5 milliLiter(s) IntraMuscular once  nicotine - 21 mG/24Hr(s) Patch 1 patch Transdermal daily  pantoprazole    Tablet 40 milliGRAM(s) Oral before breakfast  QUEtiapine 50 milliGRAM(s) Oral every 8 hours PRN  traZODone 100 milliGRAM(s) Oral at bedtime      T(C): 36.9 (10-28-18 @ 19:06), Max: 36.9 (10-28-18 @ 19:06)  HR: 77 (10-28-18 @ 19:06) (77 - 91)  BP: 118/76 (10-28-18 @ 19:06) (118/76 - 134/76)  RR: 16 (10-28-18 @ 19:06) (16 - 18)  SpO2: --    PE;  general: no cahgns from previosu    Lungs:    Heart:    EXT:    Neuro:   alert no  d efciits                        CAPILLARY BLOOD GLUCOSE

## 2018-10-30 RX ORDER — BUPROPION HYDROCHLORIDE 150 MG/1
300 TABLET, EXTENDED RELEASE ORAL DAILY
Qty: 0 | Refills: 0 | Status: DISCONTINUED | OUTPATIENT
Start: 2018-10-31 | End: 2018-11-08

## 2018-10-30 RX ORDER — PRAZOSIN HCL 2 MG
2 CAPSULE ORAL AT BEDTIME
Qty: 0 | Refills: 0 | Status: DISCONTINUED | OUTPATIENT
Start: 2018-10-30 | End: 2018-11-08

## 2018-10-30 RX ORDER — QUETIAPINE FUMARATE 200 MG/1
50 TABLET, FILM COATED ORAL AT BEDTIME
Qty: 0 | Refills: 0 | Status: DISCONTINUED | OUTPATIENT
Start: 2018-10-30 | End: 2018-10-31

## 2018-10-30 RX ADMIN — Medication 2 MILLIGRAM(S): at 20:03

## 2018-10-30 RX ADMIN — Medication 400 MILLIGRAM(S): at 17:28

## 2018-10-30 RX ADMIN — Medication 1 PATCH: at 17:50

## 2018-10-30 RX ADMIN — DIVALPROEX SODIUM 500 MILLIGRAM(S): 500 TABLET, DELAYED RELEASE ORAL at 08:20

## 2018-10-30 RX ADMIN — BUPROPION HYDROCHLORIDE 150 MILLIGRAM(S): 150 TABLET, EXTENDED RELEASE ORAL at 08:20

## 2018-10-30 RX ADMIN — Medication 1 PATCH: at 07:34

## 2018-10-30 RX ADMIN — Medication 1 PATCH: at 08:20

## 2018-10-30 RX ADMIN — Medication 1 PATCH: at 10:57

## 2018-10-30 RX ADMIN — Medication 100 MILLIGRAM(S): at 20:03

## 2018-10-30 RX ADMIN — Medication 400 MILLIGRAM(S): at 17:35

## 2018-10-30 RX ADMIN — QUETIAPINE FUMARATE 50 MILLIGRAM(S): 200 TABLET, FILM COATED ORAL at 12:54

## 2018-10-30 RX ADMIN — QUETIAPINE FUMARATE 50 MILLIGRAM(S): 200 TABLET, FILM COATED ORAL at 20:03

## 2018-10-30 RX ADMIN — Medication 1 APPLICATION(S): at 20:54

## 2018-10-30 NOTE — CHART NOTE - NSCHARTNOTEFT_GEN_A_CORE
Patient remains on unit for continued treatment safety and observation. Patient is  visible on unit and not compliant with treatment. Patient remains with suicidal thoughts, labile mood and high anxiety. Patient tells treatment team today that his rooms view of the ocean is triggering him to walk into it in an attempt to end his life because he is not happy with the way his life is going. Patient to remain on unit until cleared by attending for discharge. Patient denies homicidal ideation and presents with no evidence of audio visual hallucinations. Patient not cleared for discharge at this time.

## 2018-10-31 RX ORDER — QUETIAPINE FUMARATE 200 MG/1
100 TABLET, FILM COATED ORAL AT BEDTIME
Qty: 0 | Refills: 0 | Status: DISCONTINUED | OUTPATIENT
Start: 2018-10-31 | End: 2018-11-08

## 2018-10-31 RX ORDER — TRAZODONE HCL 50 MG
100 TABLET ORAL AT BEDTIME
Qty: 0 | Refills: 0 | Status: DISCONTINUED | OUTPATIENT
Start: 2018-10-31 | End: 2018-11-08

## 2018-10-31 RX ADMIN — Medication 1 PATCH: at 08:32

## 2018-10-31 RX ADMIN — Medication 400 MILLIGRAM(S): at 23:09

## 2018-10-31 RX ADMIN — Medication 1 APPLICATION(S): at 08:31

## 2018-10-31 RX ADMIN — Medication 400 MILLIGRAM(S): at 20:14

## 2018-10-31 RX ADMIN — QUETIAPINE FUMARATE 50 MILLIGRAM(S): 200 TABLET, FILM COATED ORAL at 13:57

## 2018-10-31 RX ADMIN — QUETIAPINE FUMARATE 100 MILLIGRAM(S): 200 TABLET, FILM COATED ORAL at 20:12

## 2018-10-31 RX ADMIN — Medication 100 MILLIGRAM(S): at 20:19

## 2018-10-31 RX ADMIN — BUPROPION HYDROCHLORIDE 300 MILLIGRAM(S): 150 TABLET, EXTENDED RELEASE ORAL at 08:31

## 2018-10-31 RX ADMIN — DIVALPROEX SODIUM 500 MILLIGRAM(S): 500 TABLET, DELAYED RELEASE ORAL at 08:31

## 2018-10-31 RX ADMIN — Medication 1 APPLICATION(S): at 20:12

## 2018-10-31 RX ADMIN — Medication 2 MILLIGRAM(S): at 20:12

## 2018-10-31 RX ADMIN — Medication 1 PATCH: at 08:31

## 2018-10-31 RX ADMIN — Medication 1 PATCH: at 20:18

## 2018-10-31 RX ADMIN — BENZOCAINE AND MENTHOL 1 LOZENGE: 5; 1 LIQUID ORAL at 20:13

## 2018-10-31 RX ADMIN — PANTOPRAZOLE SODIUM 40 MILLIGRAM(S): 20 TABLET, DELAYED RELEASE ORAL at 07:28

## 2018-10-31 NOTE — CONSULT NOTE ADULT - ASSESSMENT
IMPRESSION: Rehab of 29 y/o  m  ptn  rehab  for rt  ue  wrist  drop      PRECAUTIONS: [  ] Cardiac  [  ] Respiratory  [  ] Seizures [  ] Contact Isolation  [  ] Droplet Isolation  [  ] Other    Weight Bearing Status:     RECOMMENDATION:    Out of Bed to Chair     DVT/Decubiti Prophylaxis    REHAB PLAN:     [   ] Bedside P/T 3-5 times a week   [  xx ]   Bedside O/T  2-3 times a week             [   ] No Rehab Therapy Indicated                   [   ]  Speech Therapy   Conditioning/ROM                                    ADL  Bed Mobility                                               Conditioning/ROM  Transfers                                                     Bed Mobility  Sitting /Standing Balance                         Transfers                                        Gait Training                                               Sitting/Standing Balance  Stair Training [   ]Applicable                    Home equipment Eval                                                                        Splinting  [   ] Only      GOALS:   ADL   [   ]   Independent                    Transfers  [   ] Independent                          Ambulation  [   ] Independent     [    ] With device                            [   ]  CG                                                         [   ]  CG                                                                  [   ] CG                            [    ] Min A                                                   [   ] Min A                                                              [   ] Min  A          DISCHARGE PLAN:   [   ]  Good candidate for Intensive Rehabilitation/Hospital based-4A [facility]                                             Will tolerate 3hrs Intensive Rehab Daily                                       [    ]  Short Term Rehab in Skilled Nursing Facility                                       [  xx  ]  Home with Outpatient or VN services OT out  ptn                                           [    ]  Possible Candidate for Intensive Hospital based Rehab
hx htn  hld    not on meds    check bp  check lipid profile    may need meds
medically stable with no acute issues

## 2018-10-31 NOTE — OCCUPATIONAL THERAPY INITIAL EVALUATION ADULT - GENERAL OBSERVATIONS, REHAB EVAL
18:15-18:40 Chart reviewed ok to be seen as confirmed with RN. Patient encountered in day room. Available for OT evaluation

## 2018-10-31 NOTE — OCCUPATIONAL THERAPY INITIAL EVALUATION ADULT - RANGE OF MOTION EXAMINATION
AROM right elbow 85/120 PROM 80/130; AROM right sup/pro WFLs; Trace AROM wrist extension/flexion; hand and shoulder WFLS

## 2018-10-31 NOTE — OCCUPATIONAL THERAPY INITIAL EVALUATION ADULT - MANUAL MUSCLE TESTING RESULTS, REHAB EVAL
grossly assessed due to/decreased ROM; grossly 2+/5 elbow and wrist 3/5 shoulder and forearm and hand
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
## 2018-11-01 DIAGNOSIS — I10 ESSENTIAL (PRIMARY) HYPERTENSION: ICD-10-CM

## 2018-11-01 LAB
CHOLEST SERPL-MCNC: 179 MG/DL — SIGNIFICANT CHANGE UP (ref 100–200)
ESTIMATED AVERAGE GLUCOSE: 100 MG/DL — SIGNIFICANT CHANGE UP (ref 68–114)
HBA1C BLD-MCNC: 5.1 % — SIGNIFICANT CHANGE UP (ref 4–5.6)
HDLC SERPL-MCNC: 46 MG/DL — SIGNIFICANT CHANGE UP
LIPID PNL WITH DIRECT LDL SERPL: 109 MG/DL — SIGNIFICANT CHANGE UP (ref 4–129)
TOTAL CHOLESTEROL/HDL RATIO MEASUREMENT: 3.9 RATIO — LOW (ref 4–5.5)
TRIGL SERPL-MCNC: 301 MG/DL — HIGH (ref 10–149)
VALPROATE SERPL-MCNC: 11 UG/ML — LOW (ref 50–100)

## 2018-11-01 RX ORDER — HYDROXYZINE HCL 10 MG
100 TABLET ORAL
Qty: 0 | Refills: 0 | Status: DISCONTINUED | OUTPATIENT
Start: 2018-11-01 | End: 2018-11-08

## 2018-11-01 RX ORDER — DIVALPROEX SODIUM 500 MG/1
500 TABLET, DELAYED RELEASE ORAL
Qty: 0 | Refills: 0 | Status: DISCONTINUED | OUTPATIENT
Start: 2018-11-01 | End: 2018-11-08

## 2018-11-01 RX ADMIN — Medication 100 MILLIGRAM(S): at 21:40

## 2018-11-01 RX ADMIN — Medication 1 PATCH: at 08:30

## 2018-11-01 RX ADMIN — Medication 200 MILLIGRAM(S): at 15:11

## 2018-11-01 RX ADMIN — Medication 1 PATCH: at 07:33

## 2018-11-01 RX ADMIN — Medication 1 APPLICATION(S): at 20:16

## 2018-11-01 RX ADMIN — DIVALPROEX SODIUM 500 MILLIGRAM(S): 500 TABLET, DELAYED RELEASE ORAL at 20:16

## 2018-11-01 RX ADMIN — DIVALPROEX SODIUM 500 MILLIGRAM(S): 500 TABLET, DELAYED RELEASE ORAL at 08:31

## 2018-11-01 RX ADMIN — BUPROPION HYDROCHLORIDE 300 MILLIGRAM(S): 150 TABLET, EXTENDED RELEASE ORAL at 08:30

## 2018-11-01 RX ADMIN — Medication 2 MILLIGRAM(S): at 20:16

## 2018-11-01 RX ADMIN — Medication 1 PATCH: at 08:36

## 2018-11-01 RX ADMIN — PANTOPRAZOLE SODIUM 40 MILLIGRAM(S): 20 TABLET, DELAYED RELEASE ORAL at 07:32

## 2018-11-01 RX ADMIN — Medication 1 APPLICATION(S): at 08:31

## 2018-11-01 RX ADMIN — Medication 200 MILLIGRAM(S): at 23:00

## 2018-11-01 RX ADMIN — QUETIAPINE FUMARATE 100 MILLIGRAM(S): 200 TABLET, FILM COATED ORAL at 20:16

## 2018-11-01 RX ADMIN — Medication 100 MILLIGRAM(S): at 20:16

## 2018-11-01 NOTE — CHART NOTE - NSCHARTNOTEFT_GEN_A_CORE
Patient remains on unit for continued treatment safety and observation. Patient is visible on unit and not compliant with treatment. Patient remains with passive suicidal thoughts, labile mood and anxiety. Patient tells treatment team today that he feels a bit better but is still having nightmares. Patient to remain on unit until cleared by attending for discharge. Patient denies homicidal ideation and presents with no evidence of audio visual hallucinations. Writer spoke with Lawanda at [facility]  x [phone] regarding pts court mandate for [facility] treatment. Patient not cleared for discharge at this time.

## 2018-11-01 NOTE — PROGRESS NOTE ADULT - SUBJECTIVE AND OBJECTIVE BOX
pt stable alert in NAD  no new complaints    SUICIDAL IDEATION  ^DRUGS  No pertinent family history in first degree relatives  MEWS Score  Huffing  Hypercholesterolemia  HTN (hypertension)  Tinea pedis  ADHD  PCP abuse  Cannabis dependence  Nicotine dependence  Anxiety  Mood disorder  Suicidal ideation  Dyslexia  ADHD  Hypercholesterolemia  Nicotine dependence  HTN (hypertension)  Suicidal ideation  Inhalant abuse  Major depressive disorder  H/O right inguinal hernia repair  DRUGS  28    HEALTH ISSUES - PROBLEM Dx:  Dyslexia  ADHD  Hypercholesterolemia: Hypercholesterolemia  Nicotine dependence: Nicotine dependence  HTN (hypertension): HTN (hypertension)  Suicidal ideation: Suicidal ideation  Inhalant abuse  Major depressive disorder        PAST MEDICAL & SURGICAL HISTORY:  Huffing  Hypercholesterolemia  HTN (hypertension)  Tinea pedis  ADHD  PCP abuse  Cannabis dependence  Nicotine dependence  Anxiety  Mood disorder  H/O right inguinal hernia repair: at his 5 Y.O  (R)    Demerol (Unknown)      FAMILY HISTORY:  No pertinent family history in first degree relatives      acetaminophen   Tablet .. 650 milliGRAM(s) Oral every 6 hours PRN  aluminum hydroxide/magnesium hydroxide/simethicone Suspension 30 milliLiter(s) Oral every 4 hours PRN  benzocaine 15 mG/menthol 3.6 mG Lozenge 1 Lozenge Oral every 3 hours PRN  buPROPion  milliGRAM(s) Oral daily  clotrimazole 1% Cream 1 Application(s) Topical two times a day  diVALproex  milliGRAM(s) Oral daily  guaiFENesin    Syrup 200 milliGRAM(s) Oral every 6 hours PRN  ibuprofen  Tablet. 400 milliGRAM(s) Oral three times a day PRN  nicotine - 21 mG/24Hr(s) Patch 1 patch Transdermal daily  pantoprazole    Tablet 40 milliGRAM(s) Oral before breakfast  prazosin. 2 milliGRAM(s) Oral at bedtime  QUEtiapine 100 milliGRAM(s) Oral at bedtime  QUEtiapine 50 milliGRAM(s) Oral every 8 hours PRN  traZODone 100 milliGRAM(s) Oral at bedtime      T(C): 35.6 (11-01-18 @ 06:43), Max: 36.4 (10-31-18 @ 18:58)  HR: 97 (11-01-18 @ 06:43) (93 - 99)  BP: 99/61 (11-01-18 @ 06:43) (99/61 - 143/84)  RR: 18 (11-01-18 @ 06:43) (18 - 18)  SpO2: --    PE;  general:  no acute changes in nad    Lungs:    Heart:    EXT:    Neuro:  alert no deficits                          CAPILLARY BLOOD GLUCOSE

## 2018-11-02 RX ORDER — SODIUM CHLORIDE 0.65 %
1 AEROSOL, SPRAY (ML) NASAL EVERY 4 HOURS
Qty: 0 | Refills: 0 | Status: DISCONTINUED | OUTPATIENT
Start: 2018-11-02 | End: 2018-11-05

## 2018-11-02 RX ORDER — FLUTICASONE PROPIONATE 50 MCG
1 SPRAY, SUSPENSION NASAL
Qty: 0 | Refills: 0 | Status: DISCONTINUED | OUTPATIENT
Start: 2018-11-02 | End: 2018-11-05

## 2018-11-02 RX ADMIN — Medication 100 MILLIGRAM(S): at 20:57

## 2018-11-02 RX ADMIN — Medication 1 APPLICATION(S): at 08:18

## 2018-11-02 RX ADMIN — Medication 200 MILLIGRAM(S): at 08:34

## 2018-11-02 RX ADMIN — Medication 1 APPLICATION(S): at 20:58

## 2018-11-02 RX ADMIN — BENZOCAINE AND MENTHOL 1 LOZENGE: 5; 1 LIQUID ORAL at 17:44

## 2018-11-02 RX ADMIN — Medication 1 PATCH: at 08:18

## 2018-11-02 RX ADMIN — Medication 200 MILLIGRAM(S): at 15:30

## 2018-11-02 RX ADMIN — Medication 2 MILLIGRAM(S): at 20:58

## 2018-11-02 RX ADMIN — PANTOPRAZOLE SODIUM 40 MILLIGRAM(S): 20 TABLET, DELAYED RELEASE ORAL at 08:18

## 2018-11-02 RX ADMIN — QUETIAPINE FUMARATE 100 MILLIGRAM(S): 200 TABLET, FILM COATED ORAL at 20:57

## 2018-11-02 RX ADMIN — Medication 600 MILLIGRAM(S): at 21:52

## 2018-11-02 RX ADMIN — Medication 1 SPRAY(S): at 21:51

## 2018-11-02 RX ADMIN — Medication 1 PATCH: at 09:28

## 2018-11-02 RX ADMIN — DIVALPROEX SODIUM 500 MILLIGRAM(S): 500 TABLET, DELAYED RELEASE ORAL at 20:59

## 2018-11-02 RX ADMIN — Medication 1 PATCH: at 08:19

## 2018-11-02 RX ADMIN — BUPROPION HYDROCHLORIDE 300 MILLIGRAM(S): 150 TABLET, EXTENDED RELEASE ORAL at 08:18

## 2018-11-02 RX ADMIN — DIVALPROEX SODIUM 500 MILLIGRAM(S): 500 TABLET, DELAYED RELEASE ORAL at 08:19

## 2018-11-02 NOTE — CHART NOTE - NSCHARTNOTEFT_GEN_A_CORE
patient c/o sore throat, sinus pressure, and nasal congestion for several days.    Vital Signs Last 24 Hrs  T(C): 36.4 (02 Nov 2018 16:21), Max: 37 (02 Nov 2018 09:55)  T(F): 97.5 (02 Nov 2018 16:21), Max: 98.6 (02 Nov 2018 09:55)  HR: 94 (02 Nov 2018 16:21) (86 - 99)  BP: 115/74 (02 Nov 2018 16:21) (102/57 - 121/75)  BP(mean): --  RR: 18 (02 Nov 2018 16:21) (18 - 19)  SpO2: --    PHYSICAL EXAM:      Constitutional: A&Ox4  mild pharyngitis noted no exudates  no lymphadenopathy  Respiratory: cta b/l  Cardiovascular: s1 s2 rrr  Gastrointestinal: soft nt  nd + bs no rebound or guarding    a/p  1. URI  -flonase  -mucinex  -saline nasal spray prn  -advil and Cepacol prn

## 2018-11-03 RX ADMIN — Medication 1 SPRAY(S): at 21:01

## 2018-11-03 RX ADMIN — Medication 2 MILLIGRAM(S): at 21:00

## 2018-11-03 RX ADMIN — BUPROPION HYDROCHLORIDE 300 MILLIGRAM(S): 150 TABLET, EXTENDED RELEASE ORAL at 08:39

## 2018-11-03 RX ADMIN — Medication 1 PATCH: at 08:40

## 2018-11-03 RX ADMIN — Medication 1 APPLICATION(S): at 08:39

## 2018-11-03 RX ADMIN — Medication 100 MILLIGRAM(S): at 10:54

## 2018-11-03 RX ADMIN — Medication 1 SPRAY(S): at 08:40

## 2018-11-03 RX ADMIN — Medication 400 MILLIGRAM(S): at 21:08

## 2018-11-03 RX ADMIN — Medication 400 MILLIGRAM(S): at 21:05

## 2018-11-03 RX ADMIN — Medication 100 MILLIGRAM(S): at 21:00

## 2018-11-03 RX ADMIN — DIVALPROEX SODIUM 500 MILLIGRAM(S): 500 TABLET, DELAYED RELEASE ORAL at 08:07

## 2018-11-03 RX ADMIN — Medication 600 MILLIGRAM(S): at 08:40

## 2018-11-03 RX ADMIN — QUETIAPINE FUMARATE 100 MILLIGRAM(S): 200 TABLET, FILM COATED ORAL at 21:00

## 2018-11-03 RX ADMIN — Medication 1 APPLICATION(S): at 21:00

## 2018-11-03 RX ADMIN — Medication 600 MILLIGRAM(S): at 21:00

## 2018-11-03 RX ADMIN — Medication 100 MILLIGRAM(S): at 21:06

## 2018-11-04 RX ORDER — BENZOCAINE AND MENTHOL 5; 1 G/100ML; G/100ML
1 LIQUID ORAL EVERY 4 HOURS
Qty: 0 | Refills: 0 | Status: DISCONTINUED | OUTPATIENT
Start: 2018-11-04 | End: 2018-11-05

## 2018-11-04 RX ADMIN — QUETIAPINE FUMARATE 100 MILLIGRAM(S): 200 TABLET, FILM COATED ORAL at 20:48

## 2018-11-04 RX ADMIN — Medication 2 MILLIGRAM(S): at 20:48

## 2018-11-04 RX ADMIN — BUPROPION HYDROCHLORIDE 300 MILLIGRAM(S): 150 TABLET, EXTENDED RELEASE ORAL at 08:16

## 2018-11-04 RX ADMIN — Medication 1 APPLICATION(S): at 20:49

## 2018-11-04 RX ADMIN — PANTOPRAZOLE SODIUM 40 MILLIGRAM(S): 20 TABLET, DELAYED RELEASE ORAL at 08:15

## 2018-11-04 RX ADMIN — Medication 600 MILLIGRAM(S): at 08:16

## 2018-11-04 RX ADMIN — Medication 1 SPRAY(S): at 20:49

## 2018-11-04 RX ADMIN — Medication 1 PATCH: at 08:21

## 2018-11-04 RX ADMIN — Medication 1 SPRAY(S): at 08:16

## 2018-11-04 RX ADMIN — Medication 100 MILLIGRAM(S): at 20:48

## 2018-11-04 RX ADMIN — DIVALPROEX SODIUM 500 MILLIGRAM(S): 500 TABLET, DELAYED RELEASE ORAL at 01:33

## 2018-11-04 RX ADMIN — Medication 1 APPLICATION(S): at 08:16

## 2018-11-04 RX ADMIN — Medication 400 MILLIGRAM(S): at 23:05

## 2018-11-04 RX ADMIN — DIVALPROEX SODIUM 500 MILLIGRAM(S): 500 TABLET, DELAYED RELEASE ORAL at 20:49

## 2018-11-04 RX ADMIN — Medication 600 MILLIGRAM(S): at 20:48

## 2018-11-04 RX ADMIN — Medication 1 PATCH: at 08:17

## 2018-11-04 RX ADMIN — DIVALPROEX SODIUM 500 MILLIGRAM(S): 500 TABLET, DELAYED RELEASE ORAL at 08:17

## 2018-11-04 RX ADMIN — Medication 400 MILLIGRAM(S): at 11:26

## 2018-11-04 RX ADMIN — Medication 400 MILLIGRAM(S): at 12:38

## 2018-11-05 RX ADMIN — Medication 2 MILLIGRAM(S): at 20:07

## 2018-11-05 RX ADMIN — PANTOPRAZOLE SODIUM 40 MILLIGRAM(S): 20 TABLET, DELAYED RELEASE ORAL at 08:29

## 2018-11-05 RX ADMIN — Medication 1 TABLET(S): at 20:03

## 2018-11-05 RX ADMIN — Medication 1 PATCH: at 08:30

## 2018-11-05 RX ADMIN — Medication 1 APPLICATION(S): at 08:30

## 2018-11-05 RX ADMIN — Medication 1 APPLICATION(S): at 20:06

## 2018-11-05 RX ADMIN — DIVALPROEX SODIUM 500 MILLIGRAM(S): 500 TABLET, DELAYED RELEASE ORAL at 20:03

## 2018-11-05 RX ADMIN — Medication 400 MILLIGRAM(S): at 12:14

## 2018-11-05 RX ADMIN — BUPROPION HYDROCHLORIDE 300 MILLIGRAM(S): 150 TABLET, EXTENDED RELEASE ORAL at 08:29

## 2018-11-05 RX ADMIN — Medication 100 MILLIGRAM(S): at 20:04

## 2018-11-05 RX ADMIN — Medication 1 SPRAY(S): at 08:30

## 2018-11-05 RX ADMIN — Medication 400 MILLIGRAM(S): at 08:31

## 2018-11-05 RX ADMIN — Medication 600 MILLIGRAM(S): at 08:29

## 2018-11-05 RX ADMIN — QUETIAPINE FUMARATE 100 MILLIGRAM(S): 200 TABLET, FILM COATED ORAL at 20:04

## 2018-11-05 RX ADMIN — Medication 100 MILLIGRAM(S): at 08:32

## 2018-11-05 RX ADMIN — Medication 400 MILLIGRAM(S): at 20:15

## 2018-11-05 RX ADMIN — DIVALPROEX SODIUM 500 MILLIGRAM(S): 500 TABLET, DELAYED RELEASE ORAL at 08:36

## 2018-11-05 NOTE — PROGRESS NOTE ADULT - SUBJECTIVE AND OBJECTIVE BOX
pt stable alert in NAD  c/o sore throat  feels like infction in moth   also with l eft arm pain  has splnt on    SUICIDAL IDEATION  ^DRUGS  No pertinent family history in first degree relatives  MEWS Score  Huffing  Hypercholesterolemia  HTN (hypertension)  Tinea pedis  ADHD  PCP abuse  Cannabis dependence  Nicotine dependence  Anxiety  Mood disorder  Suicidal ideation  Essential hypertension  Dyslexia  ADHD  Hypercholesterolemia  Nicotine dependence  HTN (hypertension)  Suicidal ideation  Inhalant abuse  Major depressive disorder  H/O right inguinal hernia repair  DRUGS  28    HEALTH ISSUES - PROBLEM Dx:  Essential hypertension: Essential hypertension  Dyslexia  ADHD  Hypercholesterolemia: Hypercholesterolemia  Nicotine dependence: Nicotine dependence  HTN (hypertension): HTN (hypertension)  Suicidal ideation: Suicidal ideation  Inhalant abuse  Major depressive disorder        PAST MEDICAL & SURGICAL HISTORY:  Huffing  Hypercholesterolemia  HTN (hypertension)  Tinea pedis  ADHD  PCP abuse  Cannabis dependence  Nicotine dependence  Anxiety  Mood disorder  H/O right inguinal hernia repair: at his 5 Y.O  (R)    Demerol (Unknown)      FAMILY HISTORY:  No pertinent family history in first degree relatives      acetaminophen   Tablet .. 650 milliGRAM(s) Oral every 6 hours PRN  aluminum hydroxide/magnesium hydroxide/simethicone Suspension 30 milliLiter(s) Oral every 4 hours PRN  benzocaine 15 mG/menthol 3.6 mG Lozenge 1 Lozenge Oral every 3 hours PRN  benzocaine 15 mG/menthol 3.6 mG Lozenge 1 Lozenge Oral every 4 hours PRN  buPROPion  milliGRAM(s) Oral daily  clotrimazole 1% Cream 1 Application(s) Topical two times a day  diVALproex  milliGRAM(s) Oral <User Schedule>  fluticasone propionate 50 MICROgram(s)/spray Nasal Spray 1 Spray(s) Both Nostrils two times a day  guaiFENesin  milliGRAM(s) Oral every 12 hours  hydrOXYzine hydrochloride 100 milliGRAM(s) Oral two times a day PRN  ibuprofen  Tablet. 400 milliGRAM(s) Oral three times a day PRN  nicotine - 21 mG/24Hr(s) Patch 1 patch Transdermal daily  pantoprazole    Tablet 40 milliGRAM(s) Oral before breakfast  prazosin. 2 milliGRAM(s) Oral at bedtime  QUEtiapine 100 milliGRAM(s) Oral at bedtime  QUEtiapine 50 milliGRAM(s) Oral every 8 hours PRN  sodium chloride 0.65% Nasal 1 Spray(s) Both Nostrils every 4 hours PRN  traZODone 100 milliGRAM(s) Oral at bedtime      T(C): 36.1 (11-05-18 @ 06:27), Max: 36.4 (11-04-18 @ 16:48)  HR: 73 (11-05-18 @ 06:27) (73 - 92)  BP: 103/64 (11-05-18 @ 06:27) (103/64 - 124/77)  RR: 18 (11-05-18 @ 06:27) (18 - 18)  SpO2: --    PE;  general:  alert very anxious   throat lclaer  neck no sswellign or nodes  Lungs:    Heart:    EXT:   splint on left wrist  Neuro: no deficits                          CAPILLARY BLOOD GLUCOSE

## 2018-11-06 RX ORDER — IBUPROFEN 200 MG
600 TABLET ORAL THREE TIMES A DAY
Qty: 0 | Refills: 0 | Status: DISCONTINUED | OUTPATIENT
Start: 2018-11-06 | End: 2018-11-08

## 2018-11-06 RX ORDER — ACETAMINOPHEN 500 MG
975 TABLET ORAL EVERY 6 HOURS
Qty: 0 | Refills: 0 | Status: DISCONTINUED | OUTPATIENT
Start: 2018-11-06 | End: 2018-11-08

## 2018-11-06 RX ORDER — LIDOCAINE 4 G/100G
5 CREAM TOPICAL
Qty: 0 | Refills: 0 | Status: DISCONTINUED | OUTPATIENT
Start: 2018-11-06 | End: 2018-11-08

## 2018-11-06 RX ORDER — BENZOCAINE AND MENTHOL 5; 1 G/100ML; G/100ML
1 LIQUID ORAL EVERY 4 HOURS
Qty: 0 | Refills: 0 | Status: DISCONTINUED | OUTPATIENT
Start: 2018-11-06 | End: 2018-11-08

## 2018-11-06 RX ADMIN — Medication 600 MILLIGRAM(S): at 20:23

## 2018-11-06 RX ADMIN — Medication 1 APPLICATION(S): at 20:16

## 2018-11-06 RX ADMIN — Medication 1 PATCH: at 08:20

## 2018-11-06 RX ADMIN — Medication 400 MILLIGRAM(S): at 12:34

## 2018-11-06 RX ADMIN — Medication 650 MILLIGRAM(S): at 12:34

## 2018-11-06 RX ADMIN — Medication 400 MILLIGRAM(S): at 08:44

## 2018-11-06 RX ADMIN — Medication 600 MILLIGRAM(S): at 21:20

## 2018-11-06 RX ADMIN — Medication 100 MILLIGRAM(S): at 20:15

## 2018-11-06 RX ADMIN — Medication 600 MILLIGRAM(S): at 17:18

## 2018-11-06 RX ADMIN — DIVALPROEX SODIUM 500 MILLIGRAM(S): 500 TABLET, DELAYED RELEASE ORAL at 09:09

## 2018-11-06 RX ADMIN — Medication 600 MILLIGRAM(S): at 20:15

## 2018-11-06 RX ADMIN — Medication 650 MILLIGRAM(S): at 11:45

## 2018-11-06 RX ADMIN — Medication 2 MILLIGRAM(S): at 20:15

## 2018-11-06 RX ADMIN — Medication 1 TABLET(S): at 20:16

## 2018-11-06 RX ADMIN — LIDOCAINE 5 MILLILITER(S): 4 CREAM TOPICAL at 21:25

## 2018-11-06 RX ADMIN — Medication 1 APPLICATION(S): at 08:21

## 2018-11-06 RX ADMIN — Medication 1 TABLET(S): at 08:20

## 2018-11-06 RX ADMIN — Medication 1 PATCH: at 07:57

## 2018-11-06 RX ADMIN — BUPROPION HYDROCHLORIDE 300 MILLIGRAM(S): 150 TABLET, EXTENDED RELEASE ORAL at 08:20

## 2018-11-06 RX ADMIN — Medication 1 PATCH: at 08:21

## 2018-11-06 RX ADMIN — DIVALPROEX SODIUM 500 MILLIGRAM(S): 500 TABLET, DELAYED RELEASE ORAL at 20:16

## 2018-11-06 RX ADMIN — PANTOPRAZOLE SODIUM 40 MILLIGRAM(S): 20 TABLET, DELAYED RELEASE ORAL at 08:20

## 2018-11-06 RX ADMIN — QUETIAPINE FUMARATE 100 MILLIGRAM(S): 200 TABLET, FILM COATED ORAL at 20:15

## 2018-11-06 NOTE — CHART NOTE - NSCHARTNOTEFT_GEN_A_CORE
Writer met with patient; Pt assessed writer provided support and education. Treatment plan and tentative discharge plan discussed. Patient is compliant with treatment and unit protocol. Patient is taking medications as prescribed and is compliant with unit rules. Patient contracts for safety on the unit. Patient is oriented on all spheres, denies current suicidal and or homicidal ideations. Patient denies audio visual hallucinations. Patient is somatic, anxious and preservative but to a lesser extend than previously observed. Patient is in good behavioral control at this time. Activities of daily living are within normal limits. Writer is waiting for a bed in [facility] so patient can continue treatment.

## 2018-11-06 NOTE — CHART NOTE - NSCHARTNOTEFT_GEN_A_CORE
pt complains of toothache. states motrin not helping. pt currently on abx for tooth infection  no wbc, afebrile  A/P; Try lidocaine oral        dental clinic        continue current tx        monitor pt

## 2018-11-07 VITALS
RESPIRATION RATE: 16 BRPM | TEMPERATURE: 97 F | SYSTOLIC BLOOD PRESSURE: 112 MMHG | DIASTOLIC BLOOD PRESSURE: 71 MMHG | HEART RATE: 73 BPM

## 2018-11-07 RX ORDER — TUBERCULIN PURIFIED PROTEIN DERIVATIVE 5 [IU]/.1ML
5 INJECTION, SOLUTION INTRADERMAL ONCE
Qty: 0 | Refills: 0 | Status: COMPLETED | OUTPATIENT
Start: 2018-11-07 | End: 2018-11-08

## 2018-11-07 RX ADMIN — Medication 600 MILLIGRAM(S): at 20:36

## 2018-11-07 RX ADMIN — Medication 975 MILLIGRAM(S): at 03:15

## 2018-11-07 RX ADMIN — Medication 100 MILLIGRAM(S): at 17:56

## 2018-11-07 RX ADMIN — QUETIAPINE FUMARATE 100 MILLIGRAM(S): 200 TABLET, FILM COATED ORAL at 20:35

## 2018-11-07 RX ADMIN — Medication 100 MILLIGRAM(S): at 20:36

## 2018-11-07 RX ADMIN — DIVALPROEX SODIUM 500 MILLIGRAM(S): 500 TABLET, DELAYED RELEASE ORAL at 08:01

## 2018-11-07 RX ADMIN — Medication 600 MILLIGRAM(S): at 19:07

## 2018-11-07 RX ADMIN — Medication 1 TABLET(S): at 20:35

## 2018-11-07 RX ADMIN — Medication 1 APPLICATION(S): at 20:36

## 2018-11-07 RX ADMIN — LIDOCAINE 5 MILLILITER(S): 4 CREAM TOPICAL at 09:03

## 2018-11-07 RX ADMIN — Medication 1 APPLICATION(S): at 08:02

## 2018-11-07 RX ADMIN — Medication 1 PATCH: at 08:00

## 2018-11-07 RX ADMIN — BUPROPION HYDROCHLORIDE 300 MILLIGRAM(S): 150 TABLET, EXTENDED RELEASE ORAL at 08:00

## 2018-11-07 RX ADMIN — PANTOPRAZOLE SODIUM 40 MILLIGRAM(S): 20 TABLET, DELAYED RELEASE ORAL at 08:00

## 2018-11-07 RX ADMIN — Medication 600 MILLIGRAM(S): at 14:39

## 2018-11-07 RX ADMIN — LIDOCAINE 5 MILLILITER(S): 4 CREAM TOPICAL at 14:51

## 2018-11-07 RX ADMIN — Medication 600 MILLIGRAM(S): at 14:52

## 2018-11-07 RX ADMIN — Medication 600 MILLIGRAM(S): at 21:03

## 2018-11-07 RX ADMIN — Medication 600 MILLIGRAM(S): at 22:10

## 2018-11-07 RX ADMIN — Medication 600 MILLIGRAM(S): at 09:13

## 2018-11-07 RX ADMIN — Medication 2 MILLIGRAM(S): at 20:35

## 2018-11-07 RX ADMIN — Medication 600 MILLIGRAM(S): at 08:00

## 2018-11-07 RX ADMIN — DIVALPROEX SODIUM 500 MILLIGRAM(S): 500 TABLET, DELAYED RELEASE ORAL at 20:36

## 2018-11-07 RX ADMIN — Medication 1 TABLET(S): at 08:00

## 2018-11-07 NOTE — PROGRESS NOTE BEHAVIORAL HEALTH - ESTIMATED DISCHARGE DATE
Immediate Brief Procedure Note    Patient: Gerard Travis    Preoperative diagnosis: PVD, nonhealing ulcers bilaterally    Postoperative diagnosis: Same    Procedure: Aortoiliac angiogram with distal runoff (right radial approach), selective angiogram of right and left lower extremity with a catheter in the right and left common femoral artery from the radial approach, selective angiogram of right common femoral artery (antegrade approach), PTA/stent of right TP trunk and PT with resolute 3.0×38 and 3.0×26.    Surgeon:  Cesar Bains MD    Assistants: None.    Anesthesia Staff: * No anesthesia staff entered *    Anesthesia Type: Conscious sedation.    Findings:   Severe below the knee disease bilaterally.  Totally occluded TP trunk and right lower extremity.  Totally occluded left popliteal artery and left lower extremity with a totally occluded left Fem-PT graft with reconstitution of PT artery in the mid to distal left leg (only runoff).  Successful PTA/stent of right TP trunk and PT with resolute 3.0×38 and 3.0×26.    Estimated Blood Loss: <50 mL.    Complications: None.    Specimens Removed: None.    
10-Nov-2018

## 2018-11-07 NOTE — PROGRESS NOTE BEHAVIORAL HEALTH - PERCEPTIONS
Other
No abnormalities
Other
Other
No abnormalities
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
## 2018-11-07 NOTE — PROGRESS NOTE BEHAVIORAL HEALTH - MOOD
Anxious/Depressed
Depressed/Anxious
Depressed/Anxious
Anxious
Anxious/Depressed
Depressed/Anxious
Anxious/Depressed
Anxious/Depressed
Depressed/Anxious
Anxious
Anxious
Anxious/Depressed

## 2018-11-07 NOTE — PROGRESS NOTE BEHAVIORAL HEALTH - NSBHPTASSESSDT_PSY_A_CORE
04-Nov-2018 12:27
05-Nov-2018 11:21
06-Nov-2018 13:35
28-Oct-2018 23:06
29-Oct-2018 15:44
30-Oct-2018 13:31
02-Nov-2018 09:45
27-Oct-2018 10:50
31-Oct-2018 13:43
26-Oct-2018 16:06
01-Nov-2018 14:55
07-Nov-2018 15:16

## 2018-11-07 NOTE — PROGRESS NOTE BEHAVIORAL HEALTH - AFFECT QUALITY
Anxious
Anxious/Depressed
Anxious/Depressed
Anxious
Anxious
Depressed/Anxious
Anxious
Depressed/Anxious
Anxious
Anxious
Depressed/Anxious
Depressed/Anxious

## 2018-11-07 NOTE — PROGRESS NOTE BEHAVIORAL HEALTH - MUSCLE TONE / STRENGTH
Other
Normal muscle tone/strength
Other
Normal muscle tone/strength
Other
Normal muscle tone/strength

## 2018-11-07 NOTE — PROGRESS NOTE BEHAVIORAL HEALTH - DETAILS
rt elbow contracture s/p r arm laceration

## 2018-11-07 NOTE — PROGRESS NOTE BEHAVIORAL HEALTH - PRN MEDS
acetaminophen   Tablet ..   975 milliGRAM(s) Oral (11-07-18 @ 03:15)    ibuprofen  Tablet.   600 milliGRAM(s) Oral (11-07-18 @ 14:39)   600 milliGRAM(s) Oral (11-07-18 @ 09:13)   600 milliGRAM(s) Oral (11-06-18 @ 20:23)    lidocaine 2% Viscous   5 milliLiter(s) Swish and Spit (11-07-18 @ 14:51)   5 milliLiter(s) Swish and Spit (11-07-18 @ 09:03)   5 milliLiter(s) Swish and Spit (11-06-18 @ 21:25)
acetaminophen   Tablet ..   650 milliGRAM(s) Oral (11-06-18 @ 11:45)    ibuprofen  Tablet.   400 milliGRAM(s) Oral (11-06-18 @ 08:44)   400 milliGRAM(s) Oral (11-05-18 @ 20:15)
hydrOXYzine hydrochloride   100 milliGRAM(s) Oral (11-05-18 @ 08:32)    ibuprofen  Tablet.   400 milliGRAM(s) Oral (11-05-18 @ 08:31)   400 milliGRAM(s) Oral (11-04-18 @ 23:05)   400 milliGRAM(s) Oral (11-04-18 @ 11:26)

## 2018-11-07 NOTE — PROGRESS NOTE BEHAVIORAL HEALTH - SECONDARY DX1
Inhalant abuse

## 2018-11-07 NOTE — PROGRESS NOTE BEHAVIORAL HEALTH - NSBHADMITIPOBSFT_PSY_A_CORE
no acute danger to self/others

## 2018-11-07 NOTE — PROGRESS NOTE BEHAVIORAL HEALTH - NSBHATTESTSEENBY_PSY_A_CORE
attending Psychiatrist without NP/Trainee
Attending Psychiatrist supervising NP/Trainee, meeting pt...
attending Psychiatrist without NP/Trainee

## 2018-11-07 NOTE — PROGRESS NOTE BEHAVIORAL HEALTH - NSBHFUPINTERVALCCFT_PSY_A_CORE
"I am waiting to go to rehab "
"I am waiting to go to rehab " Denied suicidal or homicidal ideation.
"I am waiting to go to rehab " Denied suicidal or homicidal ideation.
"I want to go to [facility]. I will kill myself if I go back on the street. "
Patient was admitted due to severe anxiety, depression and suicidal ideation with history of PTSD.
"I had terrible nightmares, I wan to be evaluated fro PTSD "
"I am waiting to go to rehab "
I was suicidal and feeling sick. I wanted to rest.
"I had a nightmare and woke up , "
"I am still having nightmares and wake up , but I am a bit better "
"I am waiting to go to rehab "

## 2018-11-07 NOTE — PROGRESS NOTE BEHAVIORAL HEALTH - NSBHFUPINTERVALHXFT_PSY_A_CORE
Pt  denies and presents no evidence for suicidal or homicidal ideas,  plans or intentions.    He reports normal appetite and regular bowel movements. Pt is tolerating meds well w/o any acute S/E, and pt has no medication related complaints.  Continues  Seroquel 100 mg po at bedtime for sleep and depressed mood .  Depakote was raised to 500 mg po bid for mood stabilization and Atarax  home medication was reintroduced . Physical rehab started for rt elbow contracture s/p r arm laceration. Pt' mood is stabilizing. Pt to be transferred to rehab next week.
Pt  denies and presents no evidence for suicidal or homicidal ideas,  plans or intentions.    He reports normal appetite and regular bowel movements. Pt is tolerating meds well w/o any acute S/E, and pt has no medication related complaints.  Continues  Seroquel 100 mg po at bedtime for sleep and depressed mood .  Depakote was raised to 500 mg po bid for mood stabilization and Atarax  home medication was reintroduced . Physical rehab started for rt elbow contracture s/p r arm laceration. Pt' mood is stabilizing. Pt to be transferred to rehab this week. Pt Is complaining of tooth ache- Augmentin was started x 7days.
Pt was seen and evaluated in his room currently cooperative and states that he is feels less anxious and depressed as well as feels safe being tin the hospital but he feels not safe being outside and the thought of him being out makes him very anxious,  depressed and suicidal.  He denies any suicidal or homicidal ideation at present and responsive to staff's verbal redirection.
Pt was seen and evaluated in his room currently cooperative and states that he is feels less anxious and depressed as well as feels safe being tin the hospital but he feels not safe being outside and the thought of him being out makes him very anxious,  depressed and suicidal.  He denies any suicidal or homicidal ideation at present and responsive to staff's verbal redirection. Depakote (home medication) was restarted.
The patient attended  today's treatment team meeting participated in tx and discharge planning and signed the attendance sheet.  Pt  denies and presents no evidence for suicidal or homicidal ideas,  plans or intentions.    He reports normal appetite and regular bowel movements. Pt is tolerating meds well w/o any acute S/E, and pt has no medication related complaints.  Continues  Seroquel 100 mg po at bedtime for sleep and depressed mood .  Contiues Depakote  500 mg po bid for mood stabilization and continues Atarax  PRN  anxiety . Physical rehab started for rt elbow contracture s/p r arm laceration. Pt' mood is stabilizing. Pt to be transferred to rehab this week. Pt Is complaining of tooth ache- Augmentin was started x 7days.
Pt seen, chart reviewed. No acute events overnight.  Patient is alert, calm, cooperative, compliant with treatment. Patient is in good behavioral control.  Pt presents no acute management problems.     ***Pt denies and presents no evidence for suicidal or homicidal ideas plans or intentions.    ***Pt c/o of poor sleep. He reports normal appetite and regular bowel movements. Pt is tolerating meds well w/o any acute S/E, and pt has no medication related complaints. Will reintroduce home meds prazosin 2m gpo at bedtime for nightmares and Seroquel 50 mg po at bedtime of rsleep and mood. Physical rehab consult requested for rt elbow contracture s/p r arm laceration.
Pt seen, chart reviewed. No acute events overnight.  Patient is alert, calm, cooperative, compliant with treatment. Patient is in good behavioral control.  Pt presents no acute management problems.     Pt  denies and presents no evidence for suicidal or homicidal ideas,  plans or intentions.    He reports normal appetite and regular bowel movements. Pt is tolerating meds well w/o any acute S/E, and pt has no medication related complaints.  Continues  Seroquel 100 mg po at bedtime for sleep and depressed mood .  Depakote was raised to 500 mg po bid for mood stabilization and Atarax  home medication was reintroduced . Physical rehab started for rt elbow contracture s/p r arm laceration. Pt' mood is stabilizing. Pt to be transferred to rehab next week.
Pt seen, chart reviewed. No acute events overnight.  Patient is alert, calm, cooperative, compliant with treatment. Patient is in good behavioral control.  Pt presents no acute management problems.     ***pt admits to SI but  denies and presents no evidence for suicidal or homicidal  plans or intentions.    He reports normal appetite and regular bowel movements. Pt is tolerating meds well w/o any acute S/E, and pt has no medication related complaints.  Will rasie  Seroquel to 100 mg po at bedtime of rsleep and depressd mood and dc trazodone.  Physical rehab consult requested for rt elbow contracture s/p r arm laceration. Pt to be transferred to rehab next week.
Pt seen, chart reviewed. No acute events overnight.  Patient is alert, calm, cooperative, compliant with treatment. Patient is in good behavioral control.  Pt presents no acute management problems.     Pt admits to SI but  denies and presents no evidence for suicidal or homicidal  plans or intentions.    He reports normal appetite and regular bowel movements. Pt is tolerating meds well w/o any acute S/E, and pt has no medication related complaints.  Continues  Seroquel 100 mg po at bedtime for sleep and depressed mood .  Depakote was raised to 500 mg po bid for mood stabilization and Visatril home medication was reintroduced . Physical rehab started for rt elbow contracture s/p r arm laceration. Pt to be transferred to rehab next week.
Pt signed voluntary admission forms today for continued treatment.  Pt  denies and presents no evidence for suicidal or homicidal ideas,  plans or intentions.    He reports normal appetite and regular bowel movements. Pt is tolerating meds well w/o any acute S/E, and pt has no medication related complaints.  Continues  Seroquel 100 mg po at bedtime for sleep and depressed mood .  Continues Depakote  500 mg po bid for mood stabilization and continues Atarax  PRN  anxiety . Physical rehab started for rt elbow contracture s/p r arm laceration. Pt' mood is stabilizing. Pt to be transferred to rehab this week. Pt Is complaining of tooth ache- Augmentin was started x 7days. Pt's discharge is scheduled projected  for  tomorrow to [facility] inpatient rehab.

## 2018-11-07 NOTE — PROGRESS NOTE BEHAVIORAL HEALTH - SECONDARY DX2
Nicotine dependence

## 2018-11-07 NOTE — PROGRESS NOTE BEHAVIORAL HEALTH - BODY HABITUS
Average build
Average build/Well nourished
Average build
Average build/Well nourished
Well nourished/Average build
Average build

## 2018-11-07 NOTE — PROGRESS NOTE BEHAVIORAL HEALTH - NSBHFUPTYPE_PSY_A_CORE
Inpatient-On Service Note
Inpatient

## 2018-11-07 NOTE — PROGRESS NOTE BEHAVIORAL HEALTH - THOUGHT CONTENT
Hopelessness/Suicidality
Suicidality/Hopelessness
Suicidality/Hopelessness
Hopelessness
Hopelessness/Suicidality
Suicidality/Hopelessness
Hopelessness/Suicidality
Suicidality/Hopelessness
Hopelessness
Hopelessness
Hopelessness/Suicidality
Suicidality/Hopelessness

## 2018-11-07 NOTE — PROGRESS NOTE BEHAVIORAL HEALTH - NSBHLEGALSTATUS_PSY_A_CORE
9.39 (Emergency)

## 2018-11-07 NOTE — PROGRESS NOTE BEHAVIORAL HEALTH - NSBHCHARTREVIEWVS_PSY_A_CORE FT
T(C): 36.3 (11-01-18 @ 10:19), Max: 36.4 (10-31-18 @ 18:58)  HR: 95 (11-01-18 @ 10:19) (95 - 99)  BP: 132/79 (11-01-18 @ 10:19) (99/61 - 132/79)  RR: 18 (11-01-18 @ 10:19) (18 - 18)  SpO2: --
T(C): 36.7 (11-07-18 @ 09:53), Max: 36.7 (11-07-18 @ 09:53)  HR: 95 (11-07-18 @ 09:53) (80 - 95)  BP: 131/74 (11-07-18 @ 09:53) (121/82 - 131/74)  RR: 18 (11-07-18 @ 09:53) (17 - 18)  SpO2: --
T(C): 35.8 (10-30-18 @ 11:05), Max: 36.4 (10-29-18 @ 17:49)  HR: 84 (10-30-18 @ 11:05) (73 - 84)  BP: 128/78 (10-30-18 @ 11:05) (111/60 - 128/78)  RR: 16 (10-30-18 @ 11:05) (16 - 18)  SpO2: --
T(C): 35.2 (10-29-18 @ 08:59), Max: 36.9 (10-28-18 @ 19:06)  HR: 89 (10-29-18 @ 08:59) (77 - 89)  BP: 135/92 (10-29-18 @ 08:59) (118/76 - 135/92)  RR: 18 (10-29-18 @ 08:59) (16 - 18)  SpO2: --
T(C): 35.9 (11-05-18 @ 10:28), Max: 36.4 (11-04-18 @ 16:48)  HR: 92 (11-05-18 @ 10:28) (73 - 92)  BP: 132/65 (11-05-18 @ 10:28) (103/64 - 132/65)  RR: 18 (11-05-18 @ 10:28) (18 - 18)  SpO2: --
T(C): 36.6 (11-06-18 @ 10:26), Max: 36.6 (11-06-18 @ 10:26)  HR: 85 (11-06-18 @ 10:26) (84 - 85)  BP: 105/61 (11-06-18 @ 10:26) (105/61 - 121/63)  RR: 18 (11-06-18 @ 10:26) (16 - 18)  SpO2: --
T(C): 36.2 (10-31-18 @ 10:10), Max: 36.6 (10-30-18 @ 17:31)  HR: 93 (10-31-18 @ 10:10) (74 - 93)  BP: 143/84 (10-31-18 @ 10:10) (110/60 - 143/84)  RR: 18 (10-31-18 @ 10:10) (16 - 20)  SpO2: --
T(C): 36.3 (11-02-18 @ 06:09), Max: 36.3 (11-01-18 @ 10:19)  HR: 86 (11-02-18 @ 06:09) (86 - 95)  BP: 102/57 (11-02-18 @ 06:09) (102/57 - 132/79)  RR: 18 (11-02-18 @ 06:09) (18 - 18)  SpO2: --

## 2018-11-08 ENCOUNTER — INPATIENT (INPATIENT)
Facility: HOSPITAL | Age: 28
LOS: 14 days | Discharge: HOME | End: 2018-11-23
Attending: INTERNAL MEDICINE | Admitting: INTERNAL MEDICINE

## 2018-11-08 DIAGNOSIS — F11.20 OPIOID DEPENDENCE, UNCOMPLICATED: ICD-10-CM

## 2018-11-08 DIAGNOSIS — Z98.890 OTHER SPECIFIED POSTPROCEDURAL STATES: Chronic | ICD-10-CM

## 2018-11-08 LAB
ALBUMIN SERPL ELPH-MCNC: 4.3 G/DL — SIGNIFICANT CHANGE UP (ref 3.5–5.2)
ALP SERPL-CCNC: 91 U/L — SIGNIFICANT CHANGE UP (ref 30–115)
ALT FLD-CCNC: 35 U/L — SIGNIFICANT CHANGE UP (ref 0–41)
ANION GAP SERPL CALC-SCNC: 16 MMOL/L — HIGH (ref 7–14)
AST SERPL-CCNC: 30 U/L — SIGNIFICANT CHANGE UP (ref 0–41)
BASOPHILS # BLD AUTO: 0.04 K/UL — SIGNIFICANT CHANGE UP (ref 0–0.2)
BASOPHILS NFR BLD AUTO: 0.7 % — SIGNIFICANT CHANGE UP (ref 0–1)
BILIRUB SERPL-MCNC: <0.2 MG/DL — SIGNIFICANT CHANGE UP (ref 0.2–1.2)
BUN SERPL-MCNC: 15 MG/DL — SIGNIFICANT CHANGE UP (ref 10–20)
CALCIUM SERPL-MCNC: 9.7 MG/DL — SIGNIFICANT CHANGE UP (ref 8.5–10.1)
CHLORIDE SERPL-SCNC: 100 MMOL/L — SIGNIFICANT CHANGE UP (ref 98–110)
CO2 SERPL-SCNC: 26 MMOL/L — SIGNIFICANT CHANGE UP (ref 17–32)
CREAT SERPL-MCNC: 0.8 MG/DL — SIGNIFICANT CHANGE UP (ref 0.7–1.5)
EOSINOPHIL # BLD AUTO: 0.17 K/UL — SIGNIFICANT CHANGE UP (ref 0–0.7)
EOSINOPHIL NFR BLD AUTO: 2.9 % — SIGNIFICANT CHANGE UP (ref 0–8)
GLUCOSE SERPL-MCNC: 112 MG/DL — HIGH (ref 70–99)
HCT VFR BLD CALC: 44.3 % — SIGNIFICANT CHANGE UP (ref 42–52)
HGB BLD-MCNC: 14.2 G/DL — SIGNIFICANT CHANGE UP (ref 14–18)
IMM GRANULOCYTES NFR BLD AUTO: 0.2 % — SIGNIFICANT CHANGE UP (ref 0.1–0.3)
LYMPHOCYTES # BLD AUTO: 2.05 K/UL — SIGNIFICANT CHANGE UP (ref 1.2–3.4)
LYMPHOCYTES # BLD AUTO: 35.3 % — SIGNIFICANT CHANGE UP (ref 20.5–51.1)
MCHC RBC-ENTMCNC: 26.4 PG — LOW (ref 27–31)
MCHC RBC-ENTMCNC: 32.1 G/DL — SIGNIFICANT CHANGE UP (ref 32–37)
MCV RBC AUTO: 82.3 FL — SIGNIFICANT CHANGE UP (ref 80–94)
MONOCYTES # BLD AUTO: 0.4 K/UL — SIGNIFICANT CHANGE UP (ref 0.1–0.6)
MONOCYTES NFR BLD AUTO: 6.9 % — SIGNIFICANT CHANGE UP (ref 1.7–9.3)
NEUTROPHILS # BLD AUTO: 3.13 K/UL — SIGNIFICANT CHANGE UP (ref 1.4–6.5)
NEUTROPHILS NFR BLD AUTO: 54 % — SIGNIFICANT CHANGE UP (ref 42.2–75.2)
NRBC # BLD: 0 /100 WBCS — SIGNIFICANT CHANGE UP (ref 0–0)
PLATELET # BLD AUTO: 242 K/UL — SIGNIFICANT CHANGE UP (ref 130–400)
POTASSIUM SERPL-MCNC: 4.8 MMOL/L — SIGNIFICANT CHANGE UP (ref 3.5–5)
POTASSIUM SERPL-SCNC: 4.8 MMOL/L — SIGNIFICANT CHANGE UP (ref 3.5–5)
PROT SERPL-MCNC: 6.4 G/DL — SIGNIFICANT CHANGE UP (ref 6–8)
RBC # BLD: 5.38 M/UL — SIGNIFICANT CHANGE UP (ref 4.7–6.1)
RBC # FLD: 12.9 % — SIGNIFICANT CHANGE UP (ref 11.5–14.5)
SODIUM SERPL-SCNC: 142 MMOL/L — SIGNIFICANT CHANGE UP (ref 135–146)
VALPROATE SERPL-MCNC: 35 UG/ML — LOW (ref 50–100)
WBC # BLD: 5.8 K/UL — SIGNIFICANT CHANGE UP (ref 4.8–10.8)
WBC # FLD AUTO: 5.8 K/UL — SIGNIFICANT CHANGE UP (ref 4.8–10.8)

## 2018-11-08 RX ORDER — MULTIVIT-MIN/FERROUS GLUCONATE 9 MG/15 ML
1 LIQUID (ML) ORAL DAILY
Qty: 0 | Refills: 0 | Status: DISCONTINUED | OUTPATIENT
Start: 2018-11-08 | End: 2018-11-23

## 2018-11-08 RX ORDER — NICOTINE POLACRILEX 2 MG
21 GUM BUCCAL
Qty: 0 | Refills: 0 | COMMUNITY
Start: 2018-11-08

## 2018-11-08 RX ORDER — IBUPROFEN 200 MG
1 TABLET ORAL
Qty: 0 | Refills: 0 | COMMUNITY
Start: 2018-11-08

## 2018-11-08 RX ORDER — HYDROXYZINE HCL 10 MG
2 TABLET ORAL
Qty: 0 | Refills: 0 | COMMUNITY
Start: 2018-11-08

## 2018-11-08 RX ORDER — HYDROXYZINE HCL 10 MG
50 TABLET ORAL EVERY 6 HOURS
Qty: 0 | Refills: 0 | Status: DISCONTINUED | OUTPATIENT
Start: 2018-11-08 | End: 2018-11-23

## 2018-11-08 RX ORDER — DIVALPROEX SODIUM 500 MG/1
500 TABLET, DELAYED RELEASE ORAL
Qty: 0 | Refills: 0 | Status: DISCONTINUED | OUTPATIENT
Start: 2018-11-08 | End: 2018-11-23

## 2018-11-08 RX ORDER — TRAZODONE HCL 50 MG
1 TABLET ORAL
Qty: 0 | Refills: 0 | COMMUNITY
Start: 2018-11-08

## 2018-11-08 RX ORDER — QUETIAPINE FUMARATE 200 MG/1
1 TABLET, FILM COATED ORAL
Qty: 0 | Refills: 0 | DISCHARGE
Start: 2018-11-08

## 2018-11-08 RX ORDER — MAGNESIUM HYDROXIDE 400 MG/1
30 TABLET, CHEWABLE ORAL ONCE
Qty: 0 | Refills: 0 | Status: DISCONTINUED | OUTPATIENT
Start: 2018-11-08 | End: 2018-11-23

## 2018-11-08 RX ORDER — BUPROPION HYDROCHLORIDE 150 MG/1
300 TABLET, EXTENDED RELEASE ORAL DAILY
Qty: 0 | Refills: 0 | Status: DISCONTINUED | OUTPATIENT
Start: 2018-11-08 | End: 2018-11-23

## 2018-11-08 RX ORDER — QUETIAPINE FUMARATE 200 MG/1
100 TABLET, FILM COATED ORAL AT BEDTIME
Qty: 0 | Refills: 0 | Status: DISCONTINUED | OUTPATIENT
Start: 2018-11-08 | End: 2018-11-23

## 2018-11-08 RX ORDER — PANTOPRAZOLE SODIUM 20 MG/1
1 TABLET, DELAYED RELEASE ORAL
Qty: 0 | Refills: 0 | COMMUNITY
Start: 2018-11-08

## 2018-11-08 RX ORDER — HYDROXYZINE HCL 10 MG
100 TABLET ORAL AT BEDTIME
Qty: 0 | Refills: 0 | Status: DISCONTINUED | OUTPATIENT
Start: 2018-11-08 | End: 2018-11-12

## 2018-11-08 RX ORDER — PANTOPRAZOLE SODIUM 20 MG/1
40 TABLET, DELAYED RELEASE ORAL
Qty: 0 | Refills: 0 | Status: DISCONTINUED | OUTPATIENT
Start: 2018-11-08 | End: 2018-11-23

## 2018-11-08 RX ORDER — METHOCARBAMOL 500 MG/1
500 TABLET, FILM COATED ORAL EVERY 6 HOURS
Qty: 0 | Refills: 0 | Status: DISCONTINUED | OUTPATIENT
Start: 2018-11-08 | End: 2018-11-12

## 2018-11-08 RX ORDER — ACETAMINOPHEN 500 MG
650 TABLET ORAL EVERY 4 HOURS
Qty: 0 | Refills: 0 | Status: DISCONTINUED | OUTPATIENT
Start: 2018-11-08 | End: 2018-11-23

## 2018-11-08 RX ORDER — NICOTINE POLACRILEX 2 MG
1 GUM BUCCAL DAILY
Qty: 0 | Refills: 0 | Status: DISCONTINUED | OUTPATIENT
Start: 2018-11-08 | End: 2018-11-23

## 2018-11-08 RX ORDER — HYDROXYZINE HCL 10 MG
50 TABLET ORAL
Qty: 0 | Refills: 0 | Status: DISCONTINUED | OUTPATIENT
Start: 2018-11-08 | End: 2018-11-08

## 2018-11-08 RX ORDER — IBUPROFEN 200 MG
600 TABLET ORAL THREE TIMES A DAY
Qty: 0 | Refills: 0 | Status: DISCONTINUED | OUTPATIENT
Start: 2018-11-08 | End: 2018-11-23

## 2018-11-08 RX ORDER — PSEUDOEPHEDRINE HCL 30 MG
60 TABLET ORAL EVERY 6 HOURS
Qty: 0 | Refills: 0 | Status: DISCONTINUED | OUTPATIENT
Start: 2018-11-08 | End: 2018-11-14

## 2018-11-08 RX ORDER — LIDOCAINE 4 G/100G
5 CREAM TOPICAL
Qty: 0 | Refills: 0 | Status: DISCONTINUED | OUTPATIENT
Start: 2018-11-08 | End: 2018-11-23

## 2018-11-08 RX ORDER — ACETAMINOPHEN 500 MG
3 TABLET ORAL
Qty: 0 | Refills: 0 | COMMUNITY
Start: 2018-11-08

## 2018-11-08 RX ORDER — LIDOCAINE 4 G/100G
5 CREAM TOPICAL
Qty: 0 | Refills: 0 | COMMUNITY
Start: 2018-11-08

## 2018-11-08 RX ORDER — TRAZODONE HCL 50 MG
100 TABLET ORAL AT BEDTIME
Qty: 0 | Refills: 0 | Status: DISCONTINUED | OUTPATIENT
Start: 2018-11-08 | End: 2018-11-23

## 2018-11-08 RX ORDER — PRAZOSIN HCL 2 MG
1 CAPSULE ORAL
Qty: 0 | Refills: 0 | DISCHARGE
Start: 2018-11-08

## 2018-11-08 RX ORDER — DIVALPROEX SODIUM 500 MG/1
1 TABLET, DELAYED RELEASE ORAL
Qty: 0 | Refills: 0 | COMMUNITY
Start: 2018-11-08

## 2018-11-08 RX ADMIN — Medication 1 PATCH: at 08:25

## 2018-11-08 RX ADMIN — QUETIAPINE FUMARATE 100 MILLIGRAM(S): 200 TABLET, FILM COATED ORAL at 20:36

## 2018-11-08 RX ADMIN — TUBERCULIN PURIFIED PROTEIN DERIVATIVE 5 UNIT(S): 5 INJECTION, SOLUTION INTRADERMAL at 08:50

## 2018-11-08 RX ADMIN — Medication 1 TABLET(S): at 20:36

## 2018-11-08 RX ADMIN — Medication 100 MILLIGRAM(S): at 20:36

## 2018-11-08 RX ADMIN — DIVALPROEX SODIUM 500 MILLIGRAM(S): 500 TABLET, DELAYED RELEASE ORAL at 08:26

## 2018-11-08 RX ADMIN — Medication 600 MILLIGRAM(S): at 08:30

## 2018-11-08 RX ADMIN — Medication 1 TABLET(S): at 08:22

## 2018-11-08 RX ADMIN — Medication 650 MILLIGRAM(S): at 20:17

## 2018-11-08 RX ADMIN — Medication 1 APPLICATION(S): at 08:25

## 2018-11-08 RX ADMIN — Medication 650 MILLIGRAM(S): at 17:46

## 2018-11-08 RX ADMIN — Medication 600 MILLIGRAM(S): at 08:22

## 2018-11-08 RX ADMIN — Medication 1 PATCH: at 08:24

## 2018-11-08 RX ADMIN — BUPROPION HYDROCHLORIDE 300 MILLIGRAM(S): 150 TABLET, EXTENDED RELEASE ORAL at 08:22

## 2018-11-08 NOTE — DISCHARGE NOTE BEHAVIORAL HEALTH - MEDICATION SUMMARY - MEDICATIONS TO TAKE
I will START or STAY ON the medications listed below when I get home from the hospital:    acetaminophen 325 mg oral tablet  -- 3 tab(s) by mouth every 6 hours, As needed, Moderate Pain (4 - 6), Severe Pain (7 - 10)  -- Indication: For Ppain    ibuprofen 600 mg oral tablet  -- 1 tab(s) by mouth 3 times a day, As needed, Moderate Pain (4 - 6), Severe Pain (7 - 10)  -- Indication: For Pain    aluminum hydroxide-magnesium hydroxide 200 mg-200 mg/5 mL oral suspension  -- 30 milliliter(s) by mouth every 4 hours, As needed, Dyspepsia  -- Indication: For Dyspepssai    prazosin 2 mg oral capsule  -- 1 cap(s) by mouth once a day (at bedtime) x 30 days  -- Indication: For Nigthmares    divalproex sodium 500 mg oral tablet, extended release  -- 1 tab(s) by mouth 2 times a day x 30 days  -- Indication: For Mood stabilization    traZODone 100 mg oral tablet  -- 1 tab(s) by mouth once a day (at bedtime) x 30 days  -- Indication: For Insomnia    QUEtiapine 100 mg oral tablet  -- 1 tab(s) by mouth once a day (at bedtime) x 30 days  -- Indication: For Mood stabilizatio    hydrOXYzine hydrochloride 50 mg oral tablet  -- 2 tab(s) by mouth 2 times a day, As needed, Anxiety  -- Indication: For Anxiety/ insomnia    clotrimazole 1% topical cream  -- 1 application on skin 2 times a day  -- Indication: For topical    lidocaine 2% mucous membrane solution  -- 5 milliliter(s) swish 4 times a day, As Needed  -- Indication: For tooth ache    amoxicillin-clavulanate 875 mg-125 mg oral tablet  -- 1 tab(s) by mouth 2 times a day  -- Indication: For tooth infcetion    pantoprazole 40 mg oral delayed release tablet  -- 1 tab(s) by mouth once a day (before a meal) x 30 days  -- Indication: For GERD    nicotine 21 mg/24 hr transdermal film, extended release  -- 21 milligram(s) by transdermal patch once a day x 30 days  -- Indication: For NRT    buPROPion 300 mg/24 hours (XL) oral tablet, extended release  -- 1 tab(s) by mouth once a day x 30 days  -- Indication: For Depression / ADHD
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
## 2018-11-08 NOTE — DISCHARGE NOTE BEHAVIORAL HEALTH - MEDICATION SUMMARY - MEDICATIONS TO CHANGE
I will SWITCH the dose or number of times a day I take the medications listed below when I get home from the hospital:    buPROPion 300 mg/24 hours (XL) oral tablet, extended release  -- 1 tab(s) by mouth once a day

## 2018-11-08 NOTE — DISCHARGE NOTE BEHAVIORAL HEALTH - NSBHDCSUBSTHXFT_PSY_A_CORE
Patient denies using alcohol; patient endorses drug use (cannabis, PCP, huffing aerosol cans); patient endorses smoking. Patient reports being in current treatment with outpatient providers at [facility], seeing therapist (Laverne Ndiaye #[phone]) and psychiatrist (Dr. Márquez) there regularly for Patient also reports being in court mandated treatment through [facility] for previous arrest relating to substance abuse (as an alternative to incarceration), and follows up with service provider Lawanda stout.

## 2018-11-08 NOTE — DISCHARGE NOTE BEHAVIORAL HEALTH - FAMILY HISTORY OF PSYCHIATRIC ILLNESS
Patient is a 28 year old , domiciled male, unemployed on SSD. Pt receives court mandated out patient psychiatric /substance abuse tx.

## 2018-11-08 NOTE — CHART NOTE - NSCHARTNOTEFT_GEN_A_CORE
Patient discharging today as per attending. Patient is AOx3, denies SI HI and AVH. Patient expresses sufficient motivation towards recovery and long term wellness. Please see below for detailed social work discharge plan.       SOCIAL WORK:    SOCIAL WORK DISCHARGE PLAN - Residence:  · Residence	other...  [facility]     Care Providers for Follow Up (Psychiatric Provider; PCP):  Care Providers for Follow up (PCP/Outpatient Provider) Charmaine Hedrick), Psychiatry  [address] [place] [address]  Phone: [phone]  Fax: [phone].    · Care Providers Direct Addresses (For SYSAdmin Use Only)	,DirectAddress_Unknown    · Additional Provider Info (For SysAdmin Use Only)	TOKEN:'[email or web address]'     Aftercare Appointments:  · Agency Name	[facility]  · Appointment Date/Time	08-Nov-2018 11:00  · Address	[address] [place] [address]  · Phone #	[phone]  · Purpose	inpatient rehab  · Agency Name	[facility]  · Appointment Date/Time	26-Nov-2018 09:00  · Address	[address] [place] [address]  · Phone #	[phone]  · Purpose	Partial Day Program     Alcohol/Substance Abuse Treatment and Referrals:  · Alcohol/Substance Use Referrals	yes...  · Alcohol/Substance Abuse Treatment Referrals	[facility]  · Other Referrals	no     Crisis Plan:  1. If I Have The Following Problems:: Suicidal Thoughts  I Should:: Report to the nearest emergency room.     Resources:  · Inpatient Psychiatric Unit - 24/7 phone #	[phone]  · National Suicide Prevention Lifeline	1 (091) 501-2742  · Does the patient have a ?	no  · Other Resources	NA     Advance Directive:  · Does patient have advance directives (both medical and psychiatric), or a surrogate decision maker?	no...  · Reason	NA     24 Hours Prior to Discharge:  · Caregiver Identified	yes...  	  · Caregiver Name and Contact Information	Nilson - Father   	  · Caregiver notified of discharge/transfer	yes   · Patient education provided	yes  · Caregiver education provided	yes   · Comments	Discharge summary faxed to next level of care.

## 2018-11-08 NOTE — CHART NOTE - NSCHARTNOTEFT_GEN_A_CORE
PPD PLACED LFA  TO BE READ IN 48-72 HOURS     *PATIENT PENDING TRANSFER TO ANOTHER UNIT*  -FLORINA, MAX

## 2018-11-08 NOTE — DISCHARGE NOTE BEHAVIORAL HEALTH - NSBHDCMEDSFT_PSY_A_CORE
bupropion, depakote, seroquel, trazdone, hydroxyzine were well tolerated with good effect bupropion, Depakote, Seroquel, trazodone, hydroxyzine were well tolerated with good effect

## 2018-11-08 NOTE — DISCHARGE NOTE BEHAVIORAL HEALTH - CARE PROVIDER_API CALL
Charmaine Hedrick), Psychiatry  [address] [place] [address]  Phone: [phone]  Fax: [phone]
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
## 2018-11-08 NOTE — DISCHARGE NOTE BEHAVIORAL HEALTH - MEDICATION SUMMARY - MEDICATIONS TO STOP TAKING
I will STOP taking the medications listed below when I get home from the hospital:    divalproex sodium 500 mg oral tablet, extended release  -- 1 tab(s) by mouth once a day    acetaminophen 325 mg oral tablet  -- 2 tab(s) by mouth every 6 hours, As needed, For Temp greater than 38.5 C (101.3 F)
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
## 2018-11-09 RX ADMIN — Medication 600 MILLIGRAM(S): at 16:15

## 2018-11-09 RX ADMIN — QUETIAPINE FUMARATE 100 MILLIGRAM(S): 200 TABLET, FILM COATED ORAL at 21:04

## 2018-11-09 RX ADMIN — Medication 50 MILLIGRAM(S): at 16:15

## 2018-11-09 RX ADMIN — Medication 650 MILLIGRAM(S): at 21:35

## 2018-11-09 RX ADMIN — Medication 1 TABLET(S): at 08:26

## 2018-11-09 RX ADMIN — Medication 100 MILLIGRAM(S): at 21:04

## 2018-11-09 RX ADMIN — BUPROPION HYDROCHLORIDE 300 MILLIGRAM(S): 150 TABLET, EXTENDED RELEASE ORAL at 08:27

## 2018-11-09 RX ADMIN — Medication 1 APPLICATION(S): at 21:03

## 2018-11-09 RX ADMIN — Medication 1 PATCH: at 08:26

## 2018-11-09 RX ADMIN — Medication 1 TABLET(S): at 08:25

## 2018-11-09 RX ADMIN — Medication 600 MILLIGRAM(S): at 08:24

## 2018-11-09 RX ADMIN — Medication 1 TABLET(S): at 21:03

## 2018-11-09 RX ADMIN — DIVALPROEX SODIUM 500 MILLIGRAM(S): 500 TABLET, DELAYED RELEASE ORAL at 08:25

## 2018-11-09 RX ADMIN — Medication 50 MILLIGRAM(S): at 08:27

## 2018-11-10 RX ADMIN — Medication 1 PATCH: at 08:02

## 2018-11-10 RX ADMIN — DIVALPROEX SODIUM 500 MILLIGRAM(S): 500 TABLET, DELAYED RELEASE ORAL at 08:02

## 2018-11-10 RX ADMIN — Medication 1 PATCH: at 09:06

## 2018-11-10 RX ADMIN — Medication 600 MILLIGRAM(S): at 21:11

## 2018-11-10 RX ADMIN — Medication 100 MILLIGRAM(S): at 21:16

## 2018-11-10 RX ADMIN — BUPROPION HYDROCHLORIDE 300 MILLIGRAM(S): 150 TABLET, EXTENDED RELEASE ORAL at 08:02

## 2018-11-10 RX ADMIN — Medication 1 TABLET(S): at 08:02

## 2018-11-10 RX ADMIN — Medication 1 TABLET(S): at 21:11

## 2018-11-10 RX ADMIN — DIVALPROEX SODIUM 500 MILLIGRAM(S): 500 TABLET, DELAYED RELEASE ORAL at 21:11

## 2018-11-10 RX ADMIN — QUETIAPINE FUMARATE 100 MILLIGRAM(S): 200 TABLET, FILM COATED ORAL at 21:11

## 2018-11-10 RX ADMIN — Medication 100 MILLIGRAM(S): at 21:11

## 2018-11-10 RX ADMIN — Medication 650 MILLIGRAM(S): at 21:12

## 2018-11-10 NOTE — CHART NOTE - NSCHARTNOTEFT_GEN_A_CORE
Called by RN; Patient complains still 1 stitch left in LUE post removal.  patient evaluated and 1 suture noted to incision line, healed over mostly.  Was able to remove the suture without much difficulty.  Patient tolerated procedure well.  RN present during procedure.

## 2018-11-11 RX ADMIN — Medication 50 MILLIGRAM(S): at 23:39

## 2018-11-11 RX ADMIN — METHOCARBAMOL 500 MILLIGRAM(S): 500 TABLET, FILM COATED ORAL at 23:39

## 2018-11-11 RX ADMIN — PANTOPRAZOLE SODIUM 40 MILLIGRAM(S): 20 TABLET, DELAYED RELEASE ORAL at 09:04

## 2018-11-11 RX ADMIN — QUETIAPINE FUMARATE 100 MILLIGRAM(S): 200 TABLET, FILM COATED ORAL at 20:45

## 2018-11-11 RX ADMIN — Medication 1 PATCH: at 09:35

## 2018-11-11 RX ADMIN — Medication 50 MILLIGRAM(S): at 14:47

## 2018-11-11 RX ADMIN — Medication 650 MILLIGRAM(S): at 21:35

## 2018-11-11 RX ADMIN — Medication 100 MILLIGRAM(S): at 20:45

## 2018-11-11 RX ADMIN — Medication 1 TABLET(S): at 20:45

## 2018-11-11 RX ADMIN — Medication 600 MILLIGRAM(S): at 09:07

## 2018-11-11 RX ADMIN — Medication 1 PATCH: at 09:05

## 2018-11-11 RX ADMIN — Medication 100 MILLIGRAM(S): at 20:46

## 2018-11-11 RX ADMIN — BUPROPION HYDROCHLORIDE 300 MILLIGRAM(S): 150 TABLET, EXTENDED RELEASE ORAL at 09:04

## 2018-11-11 RX ADMIN — Medication 600 MILLIGRAM(S): at 21:35

## 2018-11-11 RX ADMIN — DIVALPROEX SODIUM 500 MILLIGRAM(S): 500 TABLET, DELAYED RELEASE ORAL at 20:45

## 2018-11-11 RX ADMIN — Medication 1 TABLET(S): at 09:04

## 2018-11-11 RX ADMIN — DIVALPROEX SODIUM 500 MILLIGRAM(S): 500 TABLET, DELAYED RELEASE ORAL at 09:04

## 2018-11-12 RX ORDER — BACITRACIN ZINC 500 UNIT/G
1 OINTMENT IN PACKET (EA) TOPICAL
Qty: 0 | Refills: 0 | Status: COMPLETED | OUTPATIENT
Start: 2018-11-12 | End: 2018-11-19

## 2018-11-12 RX ADMIN — PANTOPRAZOLE SODIUM 40 MILLIGRAM(S): 20 TABLET, DELAYED RELEASE ORAL at 09:34

## 2018-11-12 RX ADMIN — Medication 1 PATCH: at 09:37

## 2018-11-12 RX ADMIN — QUETIAPINE FUMARATE 100 MILLIGRAM(S): 200 TABLET, FILM COATED ORAL at 21:14

## 2018-11-12 RX ADMIN — Medication 600 MILLIGRAM(S): at 13:23

## 2018-11-12 RX ADMIN — Medication 650 MILLIGRAM(S): at 22:56

## 2018-11-12 RX ADMIN — Medication 100 MILLIGRAM(S): at 21:14

## 2018-11-12 RX ADMIN — Medication 600 MILLIGRAM(S): at 21:17

## 2018-11-12 RX ADMIN — DIVALPROEX SODIUM 500 MILLIGRAM(S): 500 TABLET, DELAYED RELEASE ORAL at 09:34

## 2018-11-12 RX ADMIN — Medication 1 TABLET(S): at 09:34

## 2018-11-12 RX ADMIN — Medication 1 APPLICATION(S): at 21:14

## 2018-11-12 RX ADMIN — Medication 1 TABLET(S): at 21:14

## 2018-11-12 RX ADMIN — METHOCARBAMOL 500 MILLIGRAM(S): 500 TABLET, FILM COATED ORAL at 09:34

## 2018-11-12 RX ADMIN — Medication 1 PATCH: at 09:36

## 2018-11-12 RX ADMIN — Medication 600 MILLIGRAM(S): at 22:55

## 2018-11-12 RX ADMIN — Medication 50 MILLIGRAM(S): at 13:23

## 2018-11-12 RX ADMIN — BUPROPION HYDROCHLORIDE 300 MILLIGRAM(S): 150 TABLET, EXTENDED RELEASE ORAL at 09:34

## 2018-11-13 RX ADMIN — Medication 1 TABLET(S): at 09:20

## 2018-11-13 RX ADMIN — BUPROPION HYDROCHLORIDE 300 MILLIGRAM(S): 150 TABLET, EXTENDED RELEASE ORAL at 09:20

## 2018-11-13 RX ADMIN — Medication 600 MILLIGRAM(S): at 09:21

## 2018-11-13 RX ADMIN — Medication 600 MILLIGRAM(S): at 20:56

## 2018-11-13 RX ADMIN — Medication 1 PATCH: at 09:23

## 2018-11-13 RX ADMIN — Medication 1 APPLICATION(S): at 20:54

## 2018-11-13 RX ADMIN — DIVALPROEX SODIUM 500 MILLIGRAM(S): 500 TABLET, DELAYED RELEASE ORAL at 20:54

## 2018-11-13 RX ADMIN — Medication 1 PATCH: at 09:20

## 2018-11-13 RX ADMIN — Medication 650 MILLIGRAM(S): at 20:57

## 2018-11-13 RX ADMIN — Medication 1 TABLET(S): at 20:54

## 2018-11-13 RX ADMIN — DIVALPROEX SODIUM 500 MILLIGRAM(S): 500 TABLET, DELAYED RELEASE ORAL at 09:20

## 2018-11-13 RX ADMIN — QUETIAPINE FUMARATE 100 MILLIGRAM(S): 200 TABLET, FILM COATED ORAL at 20:54

## 2018-11-13 RX ADMIN — Medication 100 MILLIGRAM(S): at 20:54

## 2018-11-14 DIAGNOSIS — E78.00 PURE HYPERCHOLESTEROLEMIA, UNSPECIFIED: ICD-10-CM

## 2018-11-14 DIAGNOSIS — F43.10 POST-TRAUMATIC STRESS DISORDER, UNSPECIFIED: ICD-10-CM

## 2018-11-14 DIAGNOSIS — J06.9 ACUTE UPPER RESPIRATORY INFECTION, UNSPECIFIED: ICD-10-CM

## 2018-11-14 DIAGNOSIS — F18.14: ICD-10-CM

## 2018-11-14 DIAGNOSIS — F10.14 ALCOHOL ABUSE WITH ALCOHOL-INDUCED MOOD DISORDER: ICD-10-CM

## 2018-11-14 DIAGNOSIS — K04.7 PERIAPICAL ABSCESS WITHOUT SINUS: ICD-10-CM

## 2018-11-14 DIAGNOSIS — F90.9 ATTENTION-DEFICIT HYPERACTIVITY DISORDER, UNSPECIFIED TYPE: ICD-10-CM

## 2018-11-14 DIAGNOSIS — F15.14 OTHER STIMULANT ABUSE WITH STIMULANT-INDUCED MOOD DISORDER: ICD-10-CM

## 2018-11-14 DIAGNOSIS — M24.521 CONTRACTURE, RIGHT ELBOW: ICD-10-CM

## 2018-11-14 DIAGNOSIS — F17.210 NICOTINE DEPENDENCE, CIGARETTES, UNCOMPLICATED: ICD-10-CM

## 2018-11-14 DIAGNOSIS — B35.3 TINEA PEDIS: ICD-10-CM

## 2018-11-14 DIAGNOSIS — I10 ESSENTIAL (PRIMARY) HYPERTENSION: ICD-10-CM

## 2018-11-14 DIAGNOSIS — R48.0 DYSLEXIA AND ALEXIA: ICD-10-CM

## 2018-11-14 DIAGNOSIS — K21.9 GASTRO-ESOPHAGEAL REFLUX DISEASE WITHOUT ESOPHAGITIS: ICD-10-CM

## 2018-11-14 DIAGNOSIS — F33.2 MAJOR DEPRESSIVE DISORDER, RECURRENT SEVERE WITHOUT PSYCHOTIC FEATURES: ICD-10-CM

## 2018-11-14 DIAGNOSIS — R45.851 SUICIDAL IDEATIONS: ICD-10-CM

## 2018-11-14 DIAGNOSIS — M21.331 WRIST DROP, RIGHT WRIST: ICD-10-CM

## 2018-11-14 DIAGNOSIS — Z88.8 ALLERGY STATUS TO OTHER DRUGS, MEDICAMENTS AND BIOLOGICAL SUBSTANCES: ICD-10-CM
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
## 2018-11-14 DIAGNOSIS — F12.20 CANNABIS DEPENDENCE, UNCOMPLICATED: ICD-10-CM

## 2018-11-14 RX ADMIN — DIVALPROEX SODIUM 500 MILLIGRAM(S): 500 TABLET, DELAYED RELEASE ORAL at 08:40

## 2018-11-14 RX ADMIN — DIVALPROEX SODIUM 500 MILLIGRAM(S): 500 TABLET, DELAYED RELEASE ORAL at 20:46

## 2018-11-14 RX ADMIN — BUPROPION HYDROCHLORIDE 300 MILLIGRAM(S): 150 TABLET, EXTENDED RELEASE ORAL at 08:41

## 2018-11-14 RX ADMIN — Medication 600 MILLIGRAM(S): at 17:30

## 2018-11-14 RX ADMIN — Medication 650 MILLIGRAM(S): at 17:32

## 2018-11-14 RX ADMIN — QUETIAPINE FUMARATE 100 MILLIGRAM(S): 200 TABLET, FILM COATED ORAL at 20:46

## 2018-11-14 RX ADMIN — Medication 1 PATCH: at 08:41

## 2018-11-14 RX ADMIN — Medication 1 PATCH: at 08:43

## 2018-11-14 RX ADMIN — Medication 1 TABLET(S): at 20:46

## 2018-11-14 RX ADMIN — Medication 100 MILLIGRAM(S): at 20:46

## 2018-11-14 RX ADMIN — Medication 1 APPLICATION(S): at 20:46

## 2018-11-14 RX ADMIN — Medication 1 TABLET(S): at 08:40

## 2018-11-15 RX ORDER — COAL TAR 0.5 %
1 SHAMPOO TOPICAL
Qty: 0 | Refills: 0 | Status: DISCONTINUED | OUTPATIENT
Start: 2018-11-15 | End: 2018-11-23

## 2018-11-15 RX ADMIN — BUPROPION HYDROCHLORIDE 300 MILLIGRAM(S): 150 TABLET, EXTENDED RELEASE ORAL at 09:02

## 2018-11-15 RX ADMIN — Medication 650 MILLIGRAM(S): at 18:52

## 2018-11-15 RX ADMIN — Medication 1 TABLET(S): at 20:42

## 2018-11-15 RX ADMIN — Medication 1 PATCH: at 09:02

## 2018-11-15 RX ADMIN — DIVALPROEX SODIUM 500 MILLIGRAM(S): 500 TABLET, DELAYED RELEASE ORAL at 09:02

## 2018-11-15 RX ADMIN — Medication 650 MILLIGRAM(S): at 12:47

## 2018-11-15 RX ADMIN — DIVALPROEX SODIUM 500 MILLIGRAM(S): 500 TABLET, DELAYED RELEASE ORAL at 20:42

## 2018-11-15 RX ADMIN — Medication 1 PATCH: at 09:04

## 2018-11-15 RX ADMIN — Medication 1 TABLET(S): at 09:02

## 2018-11-15 RX ADMIN — Medication 100 MILLIGRAM(S): at 20:43

## 2018-11-15 RX ADMIN — QUETIAPINE FUMARATE 100 MILLIGRAM(S): 200 TABLET, FILM COATED ORAL at 20:42

## 2018-11-15 RX ADMIN — Medication 1 APPLICATION(S): at 18:03

## 2018-11-16 RX ADMIN — Medication 1 TABLET(S): at 09:02

## 2018-11-16 RX ADMIN — Medication 650 MILLIGRAM(S): at 12:43

## 2018-11-16 RX ADMIN — Medication 1 PATCH: at 09:03

## 2018-11-16 RX ADMIN — Medication 100 MILLIGRAM(S): at 20:40

## 2018-11-16 RX ADMIN — BUPROPION HYDROCHLORIDE 300 MILLIGRAM(S): 150 TABLET, EXTENDED RELEASE ORAL at 09:02

## 2018-11-16 RX ADMIN — Medication 1 PATCH: at 09:04

## 2018-11-16 RX ADMIN — Medication 650 MILLIGRAM(S): at 20:40

## 2018-11-16 RX ADMIN — DIVALPROEX SODIUM 500 MILLIGRAM(S): 500 TABLET, DELAYED RELEASE ORAL at 09:02

## 2018-11-16 RX ADMIN — Medication 1 TABLET(S): at 20:40

## 2018-11-16 RX ADMIN — Medication 1 APPLICATION(S): at 20:40

## 2018-11-16 RX ADMIN — DIVALPROEX SODIUM 500 MILLIGRAM(S): 500 TABLET, DELAYED RELEASE ORAL at 20:40

## 2018-11-16 RX ADMIN — QUETIAPINE FUMARATE 100 MILLIGRAM(S): 200 TABLET, FILM COATED ORAL at 20:40

## 2018-11-17 RX ADMIN — QUETIAPINE FUMARATE 100 MILLIGRAM(S): 200 TABLET, FILM COATED ORAL at 20:45

## 2018-11-17 RX ADMIN — Medication 650 MILLIGRAM(S): at 06:32

## 2018-11-17 RX ADMIN — Medication 100 MILLIGRAM(S): at 20:45

## 2018-11-17 RX ADMIN — Medication 650 MILLIGRAM(S): at 20:45

## 2018-11-17 RX ADMIN — BUPROPION HYDROCHLORIDE 300 MILLIGRAM(S): 150 TABLET, EXTENDED RELEASE ORAL at 09:07

## 2018-11-17 RX ADMIN — DIVALPROEX SODIUM 500 MILLIGRAM(S): 500 TABLET, DELAYED RELEASE ORAL at 20:45

## 2018-11-17 RX ADMIN — Medication 1 TABLET(S): at 09:07

## 2018-11-17 RX ADMIN — Medication 650 MILLIGRAM(S): at 16:58

## 2018-11-17 RX ADMIN — Medication 1 PATCH: at 09:09

## 2018-11-17 RX ADMIN — Medication 1 APPLICATION(S): at 20:45

## 2018-11-17 RX ADMIN — Medication 1 PATCH: at 09:07

## 2018-11-17 RX ADMIN — DIVALPROEX SODIUM 500 MILLIGRAM(S): 500 TABLET, DELAYED RELEASE ORAL at 09:07

## 2018-11-17 RX ADMIN — Medication 1 TABLET(S): at 20:45

## 2018-11-18 RX ADMIN — Medication 1 TABLET(S): at 21:14

## 2018-11-18 RX ADMIN — QUETIAPINE FUMARATE 100 MILLIGRAM(S): 200 TABLET, FILM COATED ORAL at 21:14

## 2018-11-18 RX ADMIN — BUPROPION HYDROCHLORIDE 300 MILLIGRAM(S): 150 TABLET, EXTENDED RELEASE ORAL at 08:07

## 2018-11-18 RX ADMIN — DIVALPROEX SODIUM 500 MILLIGRAM(S): 500 TABLET, DELAYED RELEASE ORAL at 21:14

## 2018-11-18 RX ADMIN — DIVALPROEX SODIUM 500 MILLIGRAM(S): 500 TABLET, DELAYED RELEASE ORAL at 08:07

## 2018-11-18 RX ADMIN — Medication 1 PATCH: at 08:07

## 2018-11-18 RX ADMIN — Medication 100 MILLIGRAM(S): at 21:14

## 2018-11-18 RX ADMIN — Medication 1 TABLET(S): at 08:07

## 2018-11-18 RX ADMIN — Medication 1 PATCH: at 12:11

## 2018-11-19 RX ADMIN — Medication 100 MILLIGRAM(S): at 21:03

## 2018-11-19 RX ADMIN — Medication 1 PATCH: at 08:57

## 2018-11-19 RX ADMIN — BUPROPION HYDROCHLORIDE 300 MILLIGRAM(S): 150 TABLET, EXTENDED RELEASE ORAL at 08:55

## 2018-11-19 RX ADMIN — Medication 1 TABLET(S): at 21:03

## 2018-11-19 RX ADMIN — QUETIAPINE FUMARATE 100 MILLIGRAM(S): 200 TABLET, FILM COATED ORAL at 21:03

## 2018-11-19 RX ADMIN — Medication 1 PATCH: at 08:55

## 2018-11-19 RX ADMIN — Medication 50 MILLIGRAM(S): at 12:38

## 2018-11-19 RX ADMIN — DIVALPROEX SODIUM 500 MILLIGRAM(S): 500 TABLET, DELAYED RELEASE ORAL at 08:55

## 2018-11-19 RX ADMIN — Medication 1 TABLET(S): at 08:54

## 2018-11-20 RX ADMIN — DIVALPROEX SODIUM 500 MILLIGRAM(S): 500 TABLET, DELAYED RELEASE ORAL at 21:37

## 2018-11-20 RX ADMIN — DIVALPROEX SODIUM 500 MILLIGRAM(S): 500 TABLET, DELAYED RELEASE ORAL at 07:55

## 2018-11-20 RX ADMIN — Medication 1 TABLET(S): at 07:55

## 2018-11-20 RX ADMIN — Medication 1 PATCH: at 09:10

## 2018-11-20 RX ADMIN — Medication 100 MILLIGRAM(S): at 21:43

## 2018-11-20 RX ADMIN — Medication 50 MILLIGRAM(S): at 21:43

## 2018-11-20 RX ADMIN — BUPROPION HYDROCHLORIDE 300 MILLIGRAM(S): 150 TABLET, EXTENDED RELEASE ORAL at 07:55

## 2018-11-20 RX ADMIN — QUETIAPINE FUMARATE 100 MILLIGRAM(S): 200 TABLET, FILM COATED ORAL at 21:37

## 2018-11-20 RX ADMIN — Medication 1 TABLET(S): at 21:37

## 2018-11-20 RX ADMIN — Medication 50 MILLIGRAM(S): at 07:54

## 2018-11-20 RX ADMIN — Medication 1 PATCH: at 09:11

## 2018-11-21 RX ADMIN — Medication 1 TABLET(S): at 20:45

## 2018-11-21 RX ADMIN — Medication 1 TABLET(S): at 09:49

## 2018-11-21 RX ADMIN — Medication 1 PATCH: at 09:49

## 2018-11-21 RX ADMIN — DIVALPROEX SODIUM 500 MILLIGRAM(S): 500 TABLET, DELAYED RELEASE ORAL at 09:49

## 2018-11-21 RX ADMIN — DIVALPROEX SODIUM 500 MILLIGRAM(S): 500 TABLET, DELAYED RELEASE ORAL at 20:45

## 2018-11-21 RX ADMIN — Medication 1 PATCH: at 09:51

## 2018-11-21 RX ADMIN — QUETIAPINE FUMARATE 100 MILLIGRAM(S): 200 TABLET, FILM COATED ORAL at 20:45

## 2018-11-21 RX ADMIN — Medication 100 MILLIGRAM(S): at 20:45

## 2018-11-21 RX ADMIN — BUPROPION HYDROCHLORIDE 300 MILLIGRAM(S): 150 TABLET, EXTENDED RELEASE ORAL at 12:03

## 2018-11-22 RX ADMIN — Medication 600 MILLIGRAM(S): at 21:25

## 2018-11-22 RX ADMIN — Medication 1 PATCH: at 08:13

## 2018-11-22 RX ADMIN — Medication 50 MILLIGRAM(S): at 13:15

## 2018-11-22 RX ADMIN — Medication 1 TABLET(S): at 21:23

## 2018-11-22 RX ADMIN — QUETIAPINE FUMARATE 100 MILLIGRAM(S): 200 TABLET, FILM COATED ORAL at 21:23

## 2018-11-22 RX ADMIN — BUPROPION HYDROCHLORIDE 300 MILLIGRAM(S): 150 TABLET, EXTENDED RELEASE ORAL at 08:10

## 2018-11-22 RX ADMIN — Medication 100 MILLIGRAM(S): at 21:23

## 2018-11-22 RX ADMIN — Medication 1 PATCH: at 08:11

## 2018-11-22 RX ADMIN — DIVALPROEX SODIUM 500 MILLIGRAM(S): 500 TABLET, DELAYED RELEASE ORAL at 21:23

## 2018-11-22 RX ADMIN — Medication 1 TABLET(S): at 08:10

## 2018-11-22 RX ADMIN — DIVALPROEX SODIUM 500 MILLIGRAM(S): 500 TABLET, DELAYED RELEASE ORAL at 08:10

## 2018-11-23 VITALS
TEMPERATURE: 96 F | SYSTOLIC BLOOD PRESSURE: 145 MMHG | DIASTOLIC BLOOD PRESSURE: 90 MMHG | RESPIRATION RATE: 20 BRPM | HEART RATE: 89 BPM

## 2018-11-23 RX ADMIN — Medication 1 TABLET(S): at 08:08

## 2018-11-23 RX ADMIN — Medication 1 APPLICATION(S): at 08:09

## 2018-11-23 RX ADMIN — DIVALPROEX SODIUM 500 MILLIGRAM(S): 500 TABLET, DELAYED RELEASE ORAL at 08:08

## 2018-11-23 RX ADMIN — BUPROPION HYDROCHLORIDE 300 MILLIGRAM(S): 150 TABLET, EXTENDED RELEASE ORAL at 08:08

## 2018-11-23 NOTE — CHART NOTE - NSCHARTNOTEFT_GEN_A_CORE
Allergies:  Demerol (Unknown)      Diet: Regular    Activity: as tolerated    Follow up with    1. PMD in 2 weeks    2. Psych in 2 weeks    3.    Follow up for abnormal labs/tests    1.    Extra Instructions:      Flu Vaccine given  Yes_____         No______      Diagnosis:  Chemical Dependency   Maintain sobriety  refrain from all use      Patient Signature___________________________________________  Date_________________      Nurse Signature_____________________________________________Date_________________
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
## 2018-11-28 DIAGNOSIS — I10 ESSENTIAL (PRIMARY) HYPERTENSION: ICD-10-CM

## 2018-11-28 DIAGNOSIS — F19.20 OTHER PSYCHOACTIVE SUBSTANCE DEPENDENCE, UNCOMPLICATED: ICD-10-CM

## 2018-11-28 DIAGNOSIS — Z51.89 ENCOUNTER FOR OTHER SPECIFIED AFTERCARE: ICD-10-CM

## 2018-11-28 DIAGNOSIS — F90.9 ATTENTION-DEFICIT HYPERACTIVITY DISORDER, UNSPECIFIED TYPE: ICD-10-CM

## 2018-11-28 DIAGNOSIS — Z59.0 HOMELESSNESS: ICD-10-CM

## 2018-11-28 DIAGNOSIS — F31.9 BIPOLAR DISORDER, UNSPECIFIED: ICD-10-CM

## 2018-11-28 DIAGNOSIS — F41.9 ANXIETY DISORDER, UNSPECIFIED: ICD-10-CM

## 2018-11-28 DIAGNOSIS — Z91.5 PERSONAL HISTORY OF SELF-HARM: ICD-10-CM

## 2018-11-28 DIAGNOSIS — F43.10 POST-TRAUMATIC STRESS DISORDER, UNSPECIFIED: ICD-10-CM

## 2018-11-28 DIAGNOSIS — S59.811D: ICD-10-CM

## 2018-11-28 SDOH — ECONOMIC STABILITY - HOUSING INSECURITY: HOMELESSNESS: Z59.0

## 2019-01-21 NOTE — PATIENT PROFILE BEHAVIORAL HEALTH - NSBHPRTFCTEX_PSY_A_CORE
Airway patent, TM normal bilaterally, normal appearing mouth, nose, throat, neck supple with full range of motion, no cervical adenopathy. family support

## 2020-02-11 ENCOUNTER — INPATIENT (INPATIENT)
Facility: HOSPITAL | Age: 30
LOS: 1 days | Discharge: HOME | End: 2020-02-13
Attending: INTERNAL MEDICINE | Admitting: INTERNAL MEDICINE
Payer: MEDICARE

## 2020-02-11 VITALS
OXYGEN SATURATION: 98 % | TEMPERATURE: 98 F | DIASTOLIC BLOOD PRESSURE: 83 MMHG | RESPIRATION RATE: 18 BRPM | HEART RATE: 99 BPM | SYSTOLIC BLOOD PRESSURE: 134 MMHG

## 2020-02-11 DIAGNOSIS — Z98.890 OTHER SPECIFIED POSTPROCEDURAL STATES: Chronic | ICD-10-CM

## 2020-02-11 LAB
ALBUMIN SERPL ELPH-MCNC: 5.6 G/DL — HIGH (ref 3.5–5.2)
ALP SERPL-CCNC: 112 U/L — SIGNIFICANT CHANGE UP (ref 30–115)
ALT FLD-CCNC: 81 U/L — HIGH (ref 0–41)
AMMONIA BLD-MCNC: 30 UMOL/L — SIGNIFICANT CHANGE UP (ref 11–55)
ANION GAP SERPL CALC-SCNC: 18 MMOL/L — HIGH (ref 7–14)
APAP SERPL-MCNC: <5 UG/ML — LOW (ref 10–30)
AST SERPL-CCNC: 40 U/L — SIGNIFICANT CHANGE UP (ref 0–41)
BASOPHILS # BLD AUTO: 0.06 K/UL — SIGNIFICANT CHANGE UP (ref 0–0.2)
BASOPHILS NFR BLD AUTO: 0.6 % — SIGNIFICANT CHANGE UP (ref 0–1)
BILIRUB SERPL-MCNC: 0.4 MG/DL — SIGNIFICANT CHANGE UP (ref 0.2–1.2)
BUN SERPL-MCNC: 12 MG/DL — SIGNIFICANT CHANGE UP (ref 10–20)
CALCIUM SERPL-MCNC: 10.7 MG/DL — HIGH (ref 8.5–10.1)
CHLORIDE SERPL-SCNC: 96 MMOL/L — LOW (ref 98–110)
CO2 SERPL-SCNC: 28 MMOL/L — SIGNIFICANT CHANGE UP (ref 17–32)
CREAT SERPL-MCNC: 0.9 MG/DL — SIGNIFICANT CHANGE UP (ref 0.7–1.5)
EOSINOPHIL # BLD AUTO: 0.08 K/UL — SIGNIFICANT CHANGE UP (ref 0–0.7)
EOSINOPHIL NFR BLD AUTO: 0.8 % — SIGNIFICANT CHANGE UP (ref 0–8)
ETHANOL SERPL-MCNC: 52 MG/DL — HIGH
GLUCOSE SERPL-MCNC: 110 MG/DL — HIGH (ref 70–99)
HCT VFR BLD CALC: 53.3 % — HIGH (ref 42–52)
HGB BLD-MCNC: 18.5 G/DL — HIGH (ref 14–18)
IMM GRANULOCYTES NFR BLD AUTO: 0.3 % — SIGNIFICANT CHANGE UP (ref 0.1–0.3)
LIDOCAIN IGE QN: 40 U/L — SIGNIFICANT CHANGE UP (ref 7–60)
LYMPHOCYTES # BLD AUTO: 2.77 K/UL — SIGNIFICANT CHANGE UP (ref 1.2–3.4)
LYMPHOCYTES # BLD AUTO: 26.5 % — SIGNIFICANT CHANGE UP (ref 20.5–51.1)
MCHC RBC-ENTMCNC: 30 PG — SIGNIFICANT CHANGE UP (ref 27–31)
MCHC RBC-ENTMCNC: 34.7 G/DL — SIGNIFICANT CHANGE UP (ref 32–37)
MCV RBC AUTO: 86.4 FL — SIGNIFICANT CHANGE UP (ref 80–94)
MONOCYTES # BLD AUTO: 0.4 K/UL — SIGNIFICANT CHANGE UP (ref 0.1–0.6)
MONOCYTES NFR BLD AUTO: 3.8 % — SIGNIFICANT CHANGE UP (ref 1.7–9.3)
NEUTROPHILS # BLD AUTO: 7.1 K/UL — HIGH (ref 1.4–6.5)
NEUTROPHILS NFR BLD AUTO: 68 % — SIGNIFICANT CHANGE UP (ref 42.2–75.2)
NRBC # BLD: 0 /100 WBCS — SIGNIFICANT CHANGE UP (ref 0–0)
PLATELET # BLD AUTO: 232 K/UL — SIGNIFICANT CHANGE UP (ref 130–400)
POTASSIUM SERPL-MCNC: 3.6 MMOL/L — SIGNIFICANT CHANGE UP (ref 3.5–5)
POTASSIUM SERPL-SCNC: 3.6 MMOL/L — SIGNIFICANT CHANGE UP (ref 3.5–5)
PROT SERPL-MCNC: 8.3 G/DL — HIGH (ref 6–8)
RBC # BLD: 6.17 M/UL — HIGH (ref 4.7–6.1)
RBC # FLD: 13.4 % — SIGNIFICANT CHANGE UP (ref 11.5–14.5)
SALICYLATES SERPL-MCNC: <0.3 MG/DL — LOW (ref 4–30)
SODIUM SERPL-SCNC: 142 MMOL/L — SIGNIFICANT CHANGE UP (ref 135–146)
WBC # BLD: 10.44 K/UL — SIGNIFICANT CHANGE UP (ref 4.8–10.8)
WBC # FLD AUTO: 10.44 K/UL — SIGNIFICANT CHANGE UP (ref 4.8–10.8)

## 2020-02-11 PROCEDURE — 99285 EMERGENCY DEPT VISIT HI MDM: CPT

## 2020-02-11 PROCEDURE — 93010 ELECTROCARDIOGRAM REPORT: CPT

## 2020-02-11 NOTE — ED PROVIDER NOTE - PHYSICAL EXAMINATION
CONST: Well appearing in NAD  EYES: Sclera and conjunctiva clear.  CARD: Normal S1 S2; Normal rate and rhythm  RESP: Equal BS B/L, No wheezes, rhonchi or rales. No distress  GI: Soft, non-tender, non-distended.  MS: Normal ROM in all extremities. No edema of lower extremities, no calf pain, radial pulses 2+ bilaterally  SKIN: Warm, dry, no acute rashes. Good turgor  NEURO: A&Ox3, No focal deficits. Strength 5/5 with no sensory deficits. Steady gait, no tremors.

## 2020-02-11 NOTE — ED PROVIDER NOTE - OBJECTIVE STATEMENT
29 y.o male w/ hx of anx, PTSD, MDD, HTN, HLD presents to the ED for evaluation of alcohol abuse. Drinks alcohol daily, drank 5 pints last night.  Previous 6 months sober.  Conccerned about relapsing 29 y.o male w/ hx of anx, PTSD, MDD, HTN, HLD presents to the ED for evaluation of alcohol abuse. Drinks alcohol daily, drank 5 pints last night.  Previous 6 months sober.  Concerned about relapsing prompting visit to the ED.  Denies hallucinations, tremors, SI/HI, recent travel. NO further complaints.

## 2020-02-11 NOTE — ED PROVIDER NOTE - QRS
Decrease losartan to 50 mg daily If you do not receive a call from central scheduling to have your echo scheduled by early next week, please call them at [phone]
[phone]

## 2020-02-11 NOTE — ED PROVIDER NOTE - ATTENDING CONTRIBUTION TO CARE
29 y M PMH etoh abuse, drinks 5 pints per day, pw request for EtOH detox. Most recent drink shortly before arrival. Denies SI, HI, AVH, coingestions.  Exam: NAD, NCAT, HEENT: mmm, EOMI, PERRLA, no nystagmus, Neck: supple, nontender, nl ROM, Heart: RRR, no murmur, Lungs: Scant BCTA, no signs of increased WOB, Skin: warm, dry, no diaphoresis, Abd: NTND, no guarding or rebound. MSK: chest, back, and ext nontender, nl rom, no deformity. Neuro: A&Ox3, CN II-XII intact, normal strength 5/5 all 4 ext, nl sensation, speech, coordination, gait. No tremor. No SI, HI, AVH.  A/P: No active withdrawal. Will clear medically for safety for inpatient detox.

## 2020-02-12 DIAGNOSIS — F10.10 ALCOHOL ABUSE, UNCOMPLICATED: ICD-10-CM

## 2020-02-12 DIAGNOSIS — F39 UNSPECIFIED MOOD [AFFECTIVE] DISORDER: ICD-10-CM

## 2020-02-12 LAB
AMPHET UR-MCNC: NEGATIVE — SIGNIFICANT CHANGE UP
BARBITURATES UR SCN-MCNC: NEGATIVE — SIGNIFICANT CHANGE UP
BENZODIAZ UR-MCNC: NEGATIVE — SIGNIFICANT CHANGE UP
COCAINE METAB.OTHER UR-MCNC: NEGATIVE — SIGNIFICANT CHANGE UP
DRUG SCREEN 1, URINE RESULT: SIGNIFICANT CHANGE UP
HAV IGM SER-ACNC: SIGNIFICANT CHANGE UP
HBV CORE IGM SER-ACNC: SIGNIFICANT CHANGE UP
HBV SURFACE AG SER-ACNC: SIGNIFICANT CHANGE UP
HCV AB S/CO SERPL IA: 0.12 S/CO — SIGNIFICANT CHANGE UP (ref 0–0.99)
HCV AB SERPL-IMP: SIGNIFICANT CHANGE UP
METHADONE UR-MCNC: NEGATIVE — SIGNIFICANT CHANGE UP
OPIATES UR-MCNC: NEGATIVE — SIGNIFICANT CHANGE UP
PCP UR-MCNC: NEGATIVE — SIGNIFICANT CHANGE UP
PROPOXYPHENE QUALITATIVE URINE RESULT: NEGATIVE — SIGNIFICANT CHANGE UP
T PALLIDUM AB TITR SER: NEGATIVE — SIGNIFICANT CHANGE UP
THC UR QL: NEGATIVE — SIGNIFICANT CHANGE UP

## 2020-02-12 RX ORDER — TRAZODONE HCL 50 MG
100 TABLET ORAL AT BEDTIME
Refills: 0 | Status: DISCONTINUED | OUTPATIENT
Start: 2020-02-12 | End: 2020-02-13

## 2020-02-12 RX ORDER — GUAIFENESIN/DEXTROMETHORPHAN 600MG-30MG
5 TABLET, EXTENDED RELEASE 12 HR ORAL EVERY 4 HOURS
Refills: 0 | Status: DISCONTINUED | OUTPATIENT
Start: 2020-02-12 | End: 2020-02-13

## 2020-02-12 RX ORDER — IBUPROFEN 200 MG
400 TABLET ORAL EVERY 6 HOURS
Refills: 0 | Status: DISCONTINUED | OUTPATIENT
Start: 2020-02-12 | End: 2020-02-13

## 2020-02-12 RX ORDER — METHOCARBAMOL 500 MG/1
500 TABLET, FILM COATED ORAL EVERY 6 HOURS
Refills: 0 | Status: DISCONTINUED | OUTPATIENT
Start: 2020-02-12 | End: 2020-02-13

## 2020-02-12 RX ORDER — ACETAMINOPHEN 500 MG
650 TABLET ORAL EVERY 4 HOURS
Refills: 0 | Status: DISCONTINUED | OUTPATIENT
Start: 2020-02-12 | End: 2020-02-13

## 2020-02-12 RX ORDER — TUBERCULIN PURIFIED PROTEIN DERIVATIVE 5 [IU]/.1ML
5 INJECTION, SOLUTION INTRADERMAL ONCE
Refills: 0 | Status: COMPLETED | OUTPATIENT
Start: 2020-02-12 | End: 2020-02-12

## 2020-02-12 RX ORDER — PANTOPRAZOLE SODIUM 20 MG/1
40 TABLET, DELAYED RELEASE ORAL
Refills: 0 | Status: DISCONTINUED | OUTPATIENT
Start: 2020-02-12 | End: 2020-02-13

## 2020-02-12 RX ORDER — NICOTINE POLACRILEX 2 MG
1 GUM BUCCAL DAILY
Refills: 0 | Status: DISCONTINUED | OUTPATIENT
Start: 2020-02-12 | End: 2020-02-13

## 2020-02-12 RX ORDER — THIAMINE MONONITRATE (VIT B1) 100 MG
100 TABLET ORAL DAILY
Refills: 0 | Status: DISCONTINUED | OUTPATIENT
Start: 2020-02-12 | End: 2020-02-13

## 2020-02-12 RX ORDER — MAGNESIUM HYDROXIDE 400 MG/1
30 TABLET, CHEWABLE ORAL ONCE
Refills: 0 | Status: DISCONTINUED | OUTPATIENT
Start: 2020-02-12 | End: 2020-02-13

## 2020-02-12 RX ORDER — PSEUDOEPHEDRINE HCL 30 MG
60 TABLET ORAL EVERY 6 HOURS
Refills: 0 | Status: DISCONTINUED | OUTPATIENT
Start: 2020-02-12 | End: 2020-02-13

## 2020-02-12 RX ORDER — BUPROPION HYDROCHLORIDE 150 MG/1
300 TABLET, EXTENDED RELEASE ORAL DAILY
Refills: 0 | Status: DISCONTINUED | OUTPATIENT
Start: 2020-02-12 | End: 2020-02-12

## 2020-02-12 RX ORDER — FOLIC ACID 0.8 MG
1 TABLET ORAL DAILY
Refills: 0 | Status: DISCONTINUED | OUTPATIENT
Start: 2020-02-12 | End: 2020-02-13

## 2020-02-12 RX ORDER — MULTIVIT-MIN/FERROUS GLUCONATE 9 MG/15 ML
1 LIQUID (ML) ORAL DAILY
Refills: 0 | Status: DISCONTINUED | OUTPATIENT
Start: 2020-02-12 | End: 2020-02-13

## 2020-02-12 RX ORDER — HYDROXYZINE HCL 10 MG
50 TABLET ORAL EVERY 6 HOURS
Refills: 0 | Status: DISCONTINUED | OUTPATIENT
Start: 2020-02-12 | End: 2020-02-13

## 2020-02-12 RX ORDER — HYDROXYZINE HCL 10 MG
100 TABLET ORAL AT BEDTIME
Refills: 0 | Status: DISCONTINUED | OUTPATIENT
Start: 2020-02-12 | End: 2020-02-13

## 2020-02-12 RX ADMIN — Medication 100 MILLIGRAM(S): at 21:19

## 2020-02-12 RX ADMIN — PANTOPRAZOLE SODIUM 40 MILLIGRAM(S): 20 TABLET, DELAYED RELEASE ORAL at 06:56

## 2020-02-12 RX ADMIN — TUBERCULIN PURIFIED PROTEIN DERIVATIVE 5 UNIT(S): 5 INJECTION, SOLUTION INTRADERMAL at 12:04

## 2020-02-12 RX ADMIN — Medication 100 MILLIGRAM(S): at 00:55

## 2020-02-12 NOTE — H&P ADULT - ASSESSMENT
30 y/o male presents with c/o continuos etoh dependence.      NOTE: reviewed home meds with pt from memory.

## 2020-02-12 NOTE — H&P ADULT - HISTORY OF PRESENT ILLNESS
30 y/o male presents with c/o continuos etoh dependence.      Usage; 2 pints of frances daily    Seizures: -  MELANIE -  tremors occ  HIV STD -  Hep B -   Hep C-  smoke ; < 1/2 ppd

## 2020-02-12 NOTE — H&P ADULT - NSICDXPASTMEDICALHX_GEN_ALL_CORE_FT
PAST MEDICAL HISTORY:  ADHD     Anxiety     Cannabis dependence     HTN (hypertension)     Huffing     Hypercholesterolemia     Mood disorder     Nicotine dependence     PCP abuse     Tinea pedis

## 2020-02-13 VITALS
DIASTOLIC BLOOD PRESSURE: 66 MMHG | HEART RATE: 76 BPM | TEMPERATURE: 98 F | RESPIRATION RATE: 16 BRPM | SYSTOLIC BLOOD PRESSURE: 123 MMHG

## 2020-02-13 RX ORDER — DIVALPROEX SODIUM 500 MG/1
1 TABLET, DELAYED RELEASE ORAL
Qty: 0 | Refills: 0 | DISCHARGE

## 2020-02-13 RX ADMIN — Medication 100 MILLIGRAM(S): at 09:09

## 2020-02-13 RX ADMIN — Medication 1 TABLET(S): at 09:08

## 2020-02-13 RX ADMIN — Medication 1 MILLIGRAM(S): at 09:08

## 2020-02-13 NOTE — CHART NOTE - NSCHARTNOTEFT_GEN_A_CORE
Subsequent Inpatient Encounter                                       Detox Unit    JAUN GALVAN   29y   Male      Chief Complaint:    Follow up for Alcohol  Dependency    HPI:     I reviewed previous notes. No Change, except if noted below.             Detail:_    ROS:   I reviewed with patient.  No changes from previous notes except if noted below.             Detail: _    PFSH I reviewed with patient. No changes from previous notes except if noted below.             Detail_    Medication reconciliation performed.    MEDICATIONS  (STANDING):  folic acid 1 milliGRAM(s) Oral daily  multivitamin/minerals 1 Tablet(s) Oral daily  nicotine - 21 mG/24Hr(s) Patch 1 patch Transdermal daily  pantoprazole    Tablet 40 milliGRAM(s) Oral before breakfast  pantoprazole    Tablet 40 milliGRAM(s) Oral before breakfast  thiamine 100 milliGRAM(s) Oral daily  traZODone 100 milliGRAM(s) Oral at bedtime      MEDICATIONS  (PRN):  acetaminophen   Tablet .. 650 milliGRAM(s) Oral every 4 hours PRN Temp greater or equal to 38C (100.4F), Mild Pain (1 - 3)  aluminum hydroxide/magnesium hydroxide/simethicone Suspension 30 milliLiter(s) Oral every 6 hours PRN Heartburn  bismuth subsalicylate Liquid 30 milliLiter(s) Oral every 6 hours PRN Diarrhea  chlordiazePOXIDE 25 milliGRAM(s) Oral every 2 hours PRN CIWA-Ar score  8 - 15  chlordiazePOXIDE 50 milliGRAM(s) Oral every 1 hour PRN CIWA-Ar score GREATER THAN 15  cloNIDine 0.1 milliGRAM(s) Oral every 8 hours PRN Blood Pressure GREATER THAN 140/90 mmHG  guaifenesin/dextromethorphan  Syrup 5 milliLiter(s) Oral every 4 hours PRN Cough  hydrOXYzine hydrochloride 50 milliGRAM(s) Oral every 6 hours PRN Anxiety  hydrOXYzine hydrochloride 100 milliGRAM(s) Oral at bedtime PRN insomnia  ibuprofen  Tablet. 400 milliGRAM(s) Oral every 6 hours PRN Mild Pain (1 - 3)  magnesium hydroxide Suspension 30 milliLiter(s) Oral once PRN Constipation  methocarbamol 500 milliGRAM(s) Oral every 6 hours PRN muscle pain  pseudoephedrine 60 milliGRAM(s) Oral every 6 hours PRN Rhinitis      T(C): 35.7 (02-13-20 @ 06:05), Max: 36.6 (02-12-20 @ 10:00)  HR: 54 (02-13-20 @ 06:05) (54 - 85)  BP: 114/55 (02-13-20 @ 06:05) (92/55 - 142/70)  RR: 16 (02-13-20 @ 06:05) (14 - 16)  SpO2: --    PHYSICAL EXAM:      Constitutional: NAD, A&O x3    Eyes: PERRLA, no conjuctivitis    Neck: no lymphadenopathy    Respiratory: +air entry, no rales, no rhonchi, no wheezes    Cardiovascular: +S1 and S2, regular rate and rhythm    Gastrointestinal: +BS, soft, non-tender, not distended    Extremities:  no edema, no calf tenderness    Skin: no rashes, normal turgor                            18.5   10.44 )-----------( 232      ( 11 Feb 2020 19:27 )             53.3   02-11    142  |  96<L>  |  12  ----------------------------<  110<H>  3.6   |  28  |  0.9    Ca    10.7<H>      11 Feb 2020 19:27    TPro  8.3<H>  /  Alb  5.6<H>  /  TBili  0.4  /  DBili  x   /  AST  40  /  ALT  81<H>  /  AlkPhos  112  02-11    Ammonia, Serum: 30 umol/L (02-11-20 @ 19:27)  Treponema Pallidum Antibody Interpretation: Negative (02-11-20 @ 19:27)  Hepatitis B Surface Antigen: Nonreact (02-11-20 @ 19:27)  Hepatitis C Virus S/CO Ratio: 0.12 S/CO (02-11-20 @ 19:27)    Hepatitis C Virus Interpretation: Nonreact (02-11-20 @ 19:27)      Drug Screen 1, Urine Result: Done (02-11-20 @ 19:27)        Impression and Plan:    Primary Diagnosis:  Alcohol Dependency                                Medication: no medication given 1 day binge    Secondary Diagnosis:                                                                  Medication:    Tertiary Diagnosis:                                                                       Medication      Continue Detox Protocols. Use of PRNS as needed for withdrawal and comfort.    Adjustments to protocols:    Labs/ Tests reviewed.    Tests ordered:     Likely Disposition: __x_long term    Estimated Length of stay:__2__

## 2020-02-18 DIAGNOSIS — F32.9 MAJOR DEPRESSIVE DISORDER, SINGLE EPISODE, UNSPECIFIED: ICD-10-CM

## 2020-02-18 DIAGNOSIS — F39 UNSPECIFIED MOOD [AFFECTIVE] DISORDER: ICD-10-CM

## 2020-02-18 DIAGNOSIS — F17.200 NICOTINE DEPENDENCE, UNSPECIFIED, UNCOMPLICATED: ICD-10-CM

## 2020-02-18 DIAGNOSIS — F10.20 ALCOHOL DEPENDENCE, UNCOMPLICATED: ICD-10-CM

## 2020-02-18 DIAGNOSIS — F41.9 ANXIETY DISORDER, UNSPECIFIED: ICD-10-CM

## 2020-02-18 DIAGNOSIS — F43.10 POST-TRAUMATIC STRESS DISORDER, UNSPECIFIED: ICD-10-CM

## 2020-02-18 DIAGNOSIS — E78.00 PURE HYPERCHOLESTEROLEMIA, UNSPECIFIED: ICD-10-CM

## 2020-02-18 DIAGNOSIS — I10 ESSENTIAL (PRIMARY) HYPERTENSION: ICD-10-CM

## 2020-02-18 DIAGNOSIS — F16.10 HALLUCINOGEN ABUSE, UNCOMPLICATED: ICD-10-CM

## 2020-02-18 DIAGNOSIS — F90.9 ATTENTION-DEFICIT HYPERACTIVITY DISORDER, UNSPECIFIED TYPE: ICD-10-CM

## 2021-09-09 NOTE — ED BEHAVIORAL HEALTH ASSESSMENT NOTE - ORIENTED TO PLACE
----- Message from Yessenia Enciso, [place] sent at 9/8/2021  3:09 PM EDT -----  Called. Informed pt of results. Pt voiced understanding. Stated he would like something in writing explaining results. Would like a referral for Neuro-surgery to go to [facility] or surrounding area. Has tried physical therapy numerous of times.
LVM for patient with Neurosurgery phone number number
Yes

## 2022-08-11 NOTE — H&P ADULT - NSHPPHYSICALEXAM_GEN_ALL_CORE
-  Vital Signs:      Temp: 97.6    Pulse:  68     RR:  14     BP:      117/76                 Constitutional: anxious A&Ox3, WD/WN  Eyes: PERRLA  Respiratory: +air entry, no rales, no rhonchi, no wheezes  Cardiovascular: +S1 and S2,RRR  Gastrointestinal: +BS, soft, non-tender, not distended, No CVAT  Extremities: no cyanosis, no edema, no calf tenderness,   Vascular: +dorsal pedis and radial pulses, no extremity cyanosis  Neurological: sensation intact, ROM equal B/L, CN II-XII intact, Gait: steady  Skin: no rashes, normal turgor, No track marks   No Decubiti present  No IV lines present  Rectal/Breasts Exam: Deferred Name band;

## 2022-09-12 NOTE — ED PROVIDER NOTE - MEDICAL DECISION MAKING DETAILS
Labs reviewed, no acute events.  Pt understns plan for surgery fup as scheduled, need for family and friend support, outpt psyche/ mental health fup, and ssx for Ed return.  dc home. ambulatory

## 2023-05-04 NOTE — ED ADULT NURSE NOTE - CHIEF COMPLAINT QUOTE
requesting detox from alcohol, last drink was today, requesting to speak to a  as well/ [[phone]] at home:

## 2025-01-30 NOTE — H&P ADULT - VASCULAR
01/30/25                            Blakebongricardo Ilana Bocanegra  [address]  [address]  [facility] [address]    To Whom It May Concern:    This is to certify Moisés Bocanegra was evaluated with MARY LOU Steen on 01/30/25 and return to work on 2/3/25     RESTRICTIONS:             Electronically signed by:  MARY LOU Steen  [address] [address]  Dept Phone: [phone]        Equal and normal pulses (carotid, femoral, dorsalis pedis)

## 2025-02-22 NOTE — ED PROVIDER NOTE - CADM POA PRESS ULCER
Patient visible on the unit. Tearful at times. Loud,  disorganized, able to redirect. Compliant with evening medications. Thankful for care. Will continue to monitor and access. Q 15 minute safety checks maintained.    No

## 2025-04-02 NOTE — PATIENT PROFILE BEHAVIORAL HEALTH - WITHDRAWAL SYMPTOMS INCLUDE; NEGATIVE MOOD, URGES TO SMOKE, AND DIFFICULTY CONCENTRATING.
Imaging Results               CT Head Without Contrast (Final result)  Result time 04/02/25 03:24:49      Final result by Chaim Jewell MD (04/02/25 03:24:49)                   Impression:      Right parietal ventriculostomy shunt catheter is in unchanged position.  Ventricles are unchanged in size without evidence of hydrocephalus.    Recurrent prominence of the dural venous sinuses, increased from 12/16/2024 but similar to 04/23/2024.  This could be related to intracranial hypotension.  Extensive dural venous sinus thrombosis could also potentially cause this appearance but is considered less likely.  Correlate clinically, and with follow-up imaging if clinically warranted.    Otherwise, CT demonstrates no evidence of acute intracranial process.    This report was flagged in Epic as abnormal.    Electronically signed by resident: Oscar Narayan  Date:    04/02/2025  Time:    01:45    Electronically signed by: Chaim Jewell  Date:    04/02/2025  Time:    03:24               Narrative:    EXAMINATION:  CT HEAD WITHOUT CONTRAST    CLINICAL HISTORY:  Headache, chronic, new features or increased frequency    TECHNIQUE:  Low dose axial CT images obtained throughout the head without the use of intravenous contrast.  Axial, sagittal and coronal reconstructions were performed.    COMPARISON:  Shunt series same day, CT head 12/16/2024 and 04/23/2024    FINDINGS:  Right-sided ventriculostomy catheter is in unchanged position, again extend slightly beyond the frontal horn of the right lateral ventricle into the surrounding parenchyma.  Ventricles are unchanged in size without evidence of hydrocephalus.    Brain parenchyma is unchanged.  Unchanged tract of encephalomalacia within the right frontal lobe, likely site of prior ventriculostomy shunt.  No parenchymal hemorrhage, edema, mass effect or major vascular distribution infarct.    Pre-existing slight caudal protrusion of the cerebellum again demonstrated.    No  extra-axial blood or fluid collections.    Recurrent prominence of the dural venous sinuses, increased from 12/16/2024 but similar to 04/23/2024.    Skull/extracranial contents (limited evaluation):    No displaced calvarial fracture.    The mastoid air cells and visualized paranasal sinuses are essentially clear.                                       X-Ray Shunt Series (XPD) (Final result)  Result time 04/02/25 01:11:16      Final result by Maricarmen Maki MD (04/02/25 01:11:16)                   Impression:      No significant abnormalities along right-sided  shunt tract.      Electronically signed by: Maricarmen Maki MD  Date:    04/02/2025  Time:    01:11               Narrative:    EXAMINATION:  XR SHUNT SERIES (XPD)    CLINICAL HISTORY:  Positional HA and nausea;    TECHNIQUE:  Shunt series 6 total views were obtained.    COMPARISON:  April 2024.    FINDINGS:  Right-sided  shunt catheter is visualized which terminates looped in the lower abdomen and pelvis.  No significant abnormalities are seen along the shunt tract.  No obvious discontinuity seen.    Heart is normal in size.  Lungs are clear and symmetrically expanded.  No acute abdominal process seen.                                     The patient is signed out pending CMP and CT head.  CMP is metabolic acidosis, likely dehydration, encouraged p.o. intake at home, otherwise unremarkable.  CT head with possible mild worsening prominence and dural venous sinuses.  Patient evaluated by Neurosurgery, no acute intervention, will follow up in outpatient clinic.  Stable for discharge home.   Statement Selected

## 2025-05-09 ENCOUNTER — NON-APPOINTMENT (OUTPATIENT)
Age: 35
End: 2025-05-09

## 2025-06-17 NOTE — ED ADULT TRIAGE NOTE - NS ED NURSE DIRECT TO ROOM YN
Eliquis Approved        Filling Pharmacy: [facility]      
Eliquis Pending    Insurance response  Prescription Drug Insurance: [employer]  Notes: Prior authorization submitted - will update provider when decision has been made by insurance.       
No